# Patient Record
Sex: FEMALE | Race: OTHER | URBAN - METROPOLITAN AREA
[De-identification: names, ages, dates, MRNs, and addresses within clinical notes are randomized per-mention and may not be internally consistent; named-entity substitution may affect disease eponyms.]

---

## 2022-04-29 ENCOUNTER — TELEPHONE (OUTPATIENT)
Dept: OBGYN CLINIC | Facility: CLINIC | Age: 27
End: 2022-04-29

## 2022-04-29 NOTE — TELEPHONE ENCOUNTER
Pt confirms lmp 3/6/22 and cycles 28-32 days. Pt agrees to PN appt rotation with our 2 male and 4 female providers. OBN appt scheduled. Advised pt to start PNV with iron, folic acid and dha.

## 2022-05-10 ENCOUNTER — NURSE ONLY (OUTPATIENT)
Dept: OBGYN CLINIC | Facility: CLINIC | Age: 27
End: 2022-05-10
Payer: COMMERCIAL

## 2022-05-10 VITALS — BODY MASS INDEX: 24.83 KG/M2 | WEIGHT: 149 LBS | HEIGHT: 65 IN

## 2022-05-10 DIAGNOSIS — Z34.81 ENCOUNTER FOR SUPERVISION OF OTHER NORMAL PREGNANCY IN FIRST TRIMESTER: Primary | ICD-10-CM

## 2022-05-10 LAB
CONTROL LINE PRESENT WITH A CLEAR BACKGROUND (YES/NO): YES YES/NO
PREGNANCY TEST, URINE: POSITIVE

## 2022-05-10 PROCEDURE — 81025 URINE PREGNANCY TEST: CPT | Performed by: OBSTETRICS & GYNECOLOGY

## 2022-05-10 PROCEDURE — 3008F BODY MASS INDEX DOCD: CPT | Performed by: OBSTETRICS & GYNECOLOGY

## 2022-05-10 RX ORDER — CHOLECALCIFEROL (VITAMIN D3) 25 MCG
1 TABLET,CHEWABLE ORAL DAILY
COMMUNITY

## 2022-05-10 NOTE — PROGRESS NOTES
Pt seen for OBN appt today with no complaints. Normal PN labs ordered, plus hep C and varicella igg. Pt advised all labs must be completed and resulted prior to MD appt. Pt walked to  to schedule NPN appt with MD.    Landon Paez name is Brenden Cantu contact #[phone]; race:   Occupation:      MEDICAL HISTORY    Anemia Yes Hx of anemia, age 17   Anesthetic complications No    Anxiety/Depression  No    Autoimmune Disorder No    Asthma  No    Cancer No    Diabetes  No    Gyne/breast Surgery No    Heart Disease No    Hepatitis/Liver Disease  No    History of blood transfusion No    History of abnormal pap No    Hypertension  No    Infertility  No    Kidney Disease/Frequent UTIs  No    Medication Allergies No    Latex Allergies No    Food Allergies  No    Neurological Disorder/Epilepsy No    Operations/Hospitalizations No    TB exposure  No    Thyroid Dysfunction No    Trauma/Violence  No    Uterine Anomaly  No    Uterine Fibroids  No    Variocosities/DVTs No    Smoker No    Drug usage in prior year No    Alcohol No    Would you accept a blood transfusion? Yes                INFECTION HISTORY    Chlamydia No    Pt or partner have hx of Genital Herpes No    Gonorrhea No    Hepatitis B No    HIV No    HPV No    MRSA No    Syphilis No    Tattoos Yes 1, right arm   Live with someone or Exposed to TB No    Rash or viral illness since LMP  No    Varicella No Varicella ordered   Recent Travel (or planned travel) to [facility] for self and or partner Yes Trip to [facility] in July   Pets Yes 2 dogs       GENETICS SCREENING    Genetic Screening    Genetic Screening/Teratology Counseling- Includes patient, baby's father, or anyone in either family with:  Patient's age 28 years or older as of estimated date of delivery: No   Thalassemia ([place], [place], [address], or  background):  MCV less than 80: No   Neural tube defect (Meningomyelocele, Spina bifida, or Anencephaly): No   Congenital heart defect: No   Down syndrome: No   Aakash-Sachs (Ashkenazi [place], [place], [place]): No   Canavan disease (Ashkenazi Yaz[place]): No   Familial dysautonomia (Ashkenazi [place]az[place]): No   Sickle cell disease or trait (): No   Hemophilia or other blood disorders: No   Muscular dystrophy: No    Cystic fibrosis: No   Kurt's chorea: No   Intellectual disability and/or autism: No   Other inherited genetic or chromosomal disorder: No   Maternal metabolic disorder (eg. Type 1 diabetes, PKU): No   Patient or baby's father had child with birth defects not listed above: No   Recurrent pregnancy loss, or a stillbirth: No   Medications (including supplements, vitamins, herbs, or OTC drugs)/illicit/recreational drugs/alcohol since last menstrual period: No           MISC    Infant vaccinations  Yes       Pt. Has answered NO 5P questions and has NO  risk factors. Pt. Given What pregnant women need to know handout.

## 2022-05-16 ENCOUNTER — LAB ENCOUNTER (OUTPATIENT)
Dept: LAB | Facility: HOSPITAL | Age: 27
End: 2022-05-16
Attending: OBSTETRICS & GYNECOLOGY
Payer: COMMERCIAL

## 2022-05-16 DIAGNOSIS — Z34.81 ENCOUNTER FOR SUPERVISION OF OTHER NORMAL PREGNANCY IN FIRST TRIMESTER: ICD-10-CM

## 2022-05-16 LAB
ANTIBODY SCREEN: NEGATIVE
BASOPHILS # BLD AUTO: 0.01 X10(3) UL (ref 0–0.2)
BASOPHILS NFR BLD AUTO: 0.2 %
DEPRECATED RDW RBC AUTO: 40.6 FL (ref 35.1–46.3)
EOSINOPHIL # BLD AUTO: 0.02 X10(3) UL (ref 0–0.7)
EOSINOPHIL NFR BLD AUTO: 0.4 %
ERYTHROCYTE [DISTWIDTH] IN BLOOD BY AUTOMATED COUNT: 12.3 % (ref 11–15)
HBV SURFACE AG SER-ACNC: <0.1 [IU]/L
HBV SURFACE AG SERPL QL IA: NONREACTIVE
HCT VFR BLD AUTO: 38 %
HCV AB SERPL QL IA: NONREACTIVE
HGB BLD-MCNC: 12.5 G/DL
IMM GRANULOCYTES # BLD AUTO: 0.01 X10(3) UL (ref 0–1)
IMM GRANULOCYTES NFR BLD: 0.2 %
LYMPHOCYTES # BLD AUTO: 1.48 X10(3) UL (ref 1–4)
LYMPHOCYTES NFR BLD AUTO: 27 %
MCH RBC QN AUTO: 29.8 PG (ref 26–34)
MCHC RBC AUTO-ENTMCNC: 32.9 G/DL (ref 31–37)
MCV RBC AUTO: 90.7 FL
MONOCYTES # BLD AUTO: 0.25 X10(3) UL (ref 0.1–1)
MONOCYTES NFR BLD AUTO: 4.6 %
NEUTROPHILS # BLD AUTO: 3.71 X10 (3) UL (ref 1.5–7.7)
NEUTROPHILS # BLD AUTO: 3.71 X10(3) UL (ref 1.5–7.7)
NEUTROPHILS NFR BLD AUTO: 67.6 %
PLATELET # BLD AUTO: 211 10(3)UL (ref 150–450)
RBC # BLD AUTO: 4.19 X10(6)UL
RH BLOOD TYPE: POSITIVE
RUBV IGG SER QL: POSITIVE
RUBV IGG SER-ACNC: 111.2 IU/ML (ref 10–?)
WBC # BLD AUTO: 5.5 X10(3) UL (ref 4–11)

## 2022-05-16 PROCEDURE — 86850 RBC ANTIBODY SCREEN: CPT

## 2022-05-16 PROCEDURE — 36415 COLL VENOUS BLD VENIPUNCTURE: CPT

## 2022-05-16 PROCEDURE — 86900 BLOOD TYPING SEROLOGIC ABO: CPT

## 2022-05-16 PROCEDURE — 86780 TREPONEMA PALLIDUM: CPT

## 2022-05-16 PROCEDURE — 87086 URINE CULTURE/COLONY COUNT: CPT

## 2022-05-16 PROCEDURE — 86787 VARICELLA-ZOSTER ANTIBODY: CPT

## 2022-05-16 PROCEDURE — 85025 COMPLETE CBC W/AUTO DIFF WBC: CPT

## 2022-05-16 PROCEDURE — 86901 BLOOD TYPING SEROLOGIC RH(D): CPT

## 2022-05-16 PROCEDURE — 86762 RUBELLA ANTIBODY: CPT

## 2022-05-16 PROCEDURE — 87340 HEPATITIS B SURFACE AG IA: CPT

## 2022-05-16 PROCEDURE — 87389 HIV-1 AG W/HIV-1&-2 AB AG IA: CPT

## 2022-05-16 PROCEDURE — 86803 HEPATITIS C AB TEST: CPT

## 2022-05-18 LAB
T PALLIDUM AB SER QL: NEGATIVE
VZV IGG SER IA-ACNC: 1933 (ref 165–?)

## 2022-05-19 ENCOUNTER — INITIAL PRENATAL (OUTPATIENT)
Dept: OBGYN CLINIC | Facility: CLINIC | Age: 27
End: 2022-05-19
Payer: COMMERCIAL

## 2022-05-19 VITALS
SYSTOLIC BLOOD PRESSURE: 109 MMHG | WEIGHT: 147.38 LBS | DIASTOLIC BLOOD PRESSURE: 70 MMHG | HEART RATE: 90 BPM | BODY MASS INDEX: 25 KG/M2

## 2022-05-19 DIAGNOSIS — Z11.3 SCREENING FOR STDS (SEXUALLY TRANSMITTED DISEASES): ICD-10-CM

## 2022-05-19 DIAGNOSIS — Z34.81 ENCOUNTER FOR SUPERVISION OF OTHER NORMAL PREGNANCY IN FIRST TRIMESTER: Primary | ICD-10-CM

## 2022-05-19 DIAGNOSIS — Z12.4 SCREENING FOR MALIGNANT NEOPLASM OF CERVIX: ICD-10-CM

## 2022-05-19 LAB
APPEARANCE: CLEAR
BILIRUBIN: NEGATIVE
GLUCOSE (URINE DIPSTICK): NEGATIVE MG/DL
KETONES (URINE DIPSTICK): NEGATIVE MG/DL
LEUKOCYTES: NEGATIVE
MULTISTIX LOT#: NORMAL NUMERIC
NITRITE, URINE: NEGATIVE
OCCULT BLOOD: NEGATIVE
PH, URINE: 7 (ref 4.5–8)
PROTEIN (URINE DIPSTICK): NEGATIVE MG/DL
SPECIFIC GRAVITY: 1.02 (ref 1–1.03)
URINE-COLOR: YELLOW
UROBILINOGEN,SEMI-QN: 0.2 MG/DL (ref 0–1.9)

## 2022-05-19 PROCEDURE — 81002 URINALYSIS NONAUTO W/O SCOPE: CPT | Performed by: OBSTETRICS & GYNECOLOGY

## 2022-05-19 PROCEDURE — 3074F SYST BP LT 130 MM HG: CPT | Performed by: OBSTETRICS & GYNECOLOGY

## 2022-05-19 PROCEDURE — 3078F DIAST BP <80 MM HG: CPT | Performed by: OBSTETRICS & GYNECOLOGY

## 2022-05-20 PROBLEM — Z78.9 RECENT FOREIGN TRAVEL: Status: ACTIVE | Noted: 2022-05-20

## 2022-05-20 LAB
C TRACH DNA SPEC QL NAA+PROBE: NEGATIVE
N GONORRHOEA DNA SPEC QL NAA+PROBE: NEGATIVE
T VAGINALIS RRNA SPEC QL NAA+PROBE: NEGATIVE

## 2022-06-02 ENCOUNTER — TELEPHONE (OUTPATIENT)
Dept: OBGYN CLINIC | Facility: CLINIC | Age: 27
End: 2022-06-02

## 2022-06-02 NOTE — TELEPHONE ENCOUNTER
Form received for clearance for dental for the pt.   The dental letter sent for the pt to [address] [place] [address].

## 2022-06-20 ENCOUNTER — ROUTINE PRENATAL (OUTPATIENT)
Dept: OBGYN CLINIC | Facility: CLINIC | Age: 27
End: 2022-06-20
Payer: COMMERCIAL

## 2022-06-20 VITALS
DIASTOLIC BLOOD PRESSURE: 66 MMHG | WEIGHT: 148.19 LBS | BODY MASS INDEX: 25 KG/M2 | SYSTOLIC BLOOD PRESSURE: 105 MMHG | HEART RATE: 84 BPM

## 2022-06-20 DIAGNOSIS — Z3A.15 15 WEEKS GESTATION OF PREGNANCY: ICD-10-CM

## 2022-06-20 DIAGNOSIS — Z34.80 ENCOUNTER FOR SUPERVISION OF OTHER NORMAL PREGNANCY, UNSPECIFIED TRIMESTER: Primary | ICD-10-CM

## 2022-06-20 LAB
APPEARANCE: CLEAR
BILIRUBIN: NEGATIVE
GLUCOSE (URINE DIPSTICK): NEGATIVE MG/DL
KETONES (URINE DIPSTICK): NEGATIVE MG/DL
LEUKOCYTES: NEGATIVE
MULTISTIX EXPIRATION DATE: 7 9 22 DATE
MULTISTIX LOT#: NORMAL NUMERIC
NITRITE, URINE: NEGATIVE
OCCULT BLOOD: NEGATIVE
PH, URINE: 5 (ref 4.5–8)
PROTEIN (URINE DIPSTICK): NEGATIVE MG/DL
SPECIFIC GRAVITY: 1.03 (ref 1–1.03)
URINE-COLOR: YELLOW
UROBILINOGEN,SEMI-QN: 0.2 MG/DL (ref 0–1.9)

## 2022-06-20 PROCEDURE — 3074F SYST BP LT 130 MM HG: CPT | Performed by: NURSE PRACTITIONER

## 2022-06-20 PROCEDURE — 81002 URINALYSIS NONAUTO W/O SCOPE: CPT | Performed by: NURSE PRACTITIONER

## 2022-06-20 PROCEDURE — 3078F DIAST BP <80 MM HG: CPT | Performed by: NURSE PRACTITIONER

## 2022-06-20 PROCEDURE — 76805 OB US >/= 14 WKS SNGL FETUS: CPT | Performed by: NURSE PRACTITIONER

## 2022-06-20 NOTE — PROGRESS NOTES
Feeling well. No pelvic pain or bleeding  Declines genetic testing. Anatomy US ordered  +FHTs on bedside US, unable to hear on doppler.   rto 4 weeks    TREMAYNE Martínez

## 2022-07-05 ENCOUNTER — TELEPHONE (OUTPATIENT)
Dept: OBGYN CLINIC | Facility: CLINIC | Age: 27
End: 2022-07-05

## 2022-07-06 NOTE — TELEPHONE ENCOUNTER
Pt has sudden onset of back pain when she got up out of bed this am- we discussed that she may have thrown out her back or having a muscle spasm. She is only 17 weeks pregnant. rec tylenol and intermittent heating pads and stretches.   Call if sx not improved in the am.

## 2022-07-18 ENCOUNTER — ROUTINE PRENATAL (OUTPATIENT)
Dept: OBGYN CLINIC | Facility: CLINIC | Age: 27
End: 2022-07-18
Payer: COMMERCIAL

## 2022-07-18 VITALS
HEART RATE: 78 BPM | WEIGHT: 152.63 LBS | BODY MASS INDEX: 25 KG/M2 | SYSTOLIC BLOOD PRESSURE: 112 MMHG | DIASTOLIC BLOOD PRESSURE: 69 MMHG

## 2022-07-18 DIAGNOSIS — Z34.82 ENCOUNTER FOR SUPERVISION OF OTHER NORMAL PREGNANCY IN SECOND TRIMESTER: Primary | ICD-10-CM

## 2022-07-18 LAB
BILIRUBIN: NEGATIVE
GLUCOSE (URINE DIPSTICK): NEGATIVE MG/DL
KETONES (URINE DIPSTICK): NEGATIVE MG/DL
LEUKOCYTES: NEGATIVE
MULTISTIX EXPIRATION DATE: NORMAL DATE
MULTISTIX LOT#: 2030 NUMERIC
NITRITE, URINE: NEGATIVE
OCCULT BLOOD: NEGATIVE
PH, URINE: 7.5 (ref 4.5–8)
PROTEIN (URINE DIPSTICK): NEGATIVE MG/DL
SPECIFIC GRAVITY: 1.01 (ref 1–1.03)
UROBILINOGEN,SEMI-QN: 0.2 MG/DL (ref 0–1.9)

## 2022-07-18 PROCEDURE — 3078F DIAST BP <80 MM HG: CPT | Performed by: OBSTETRICS & GYNECOLOGY

## 2022-07-18 PROCEDURE — 3074F SYST BP LT 130 MM HG: CPT | Performed by: OBSTETRICS & GYNECOLOGY

## 2022-07-18 PROCEDURE — 81002 URINALYSIS NONAUTO W/O SCOPE: CPT | Performed by: OBSTETRICS & GYNECOLOGY

## 2022-08-12 ENCOUNTER — HOSPITAL ENCOUNTER (OUTPATIENT)
Dept: ULTRASOUND IMAGING | Facility: HOSPITAL | Age: 27
Discharge: HOME | End: 2022-08-12
Attending: NURSE PRACTITIONER
Payer: COMMERCIAL

## 2022-08-12 DIAGNOSIS — Z3A.15 15 WEEKS GESTATION OF PREGNANCY: ICD-10-CM

## 2022-08-12 DIAGNOSIS — Z34.80 ENCOUNTER FOR SUPERVISION OF OTHER NORMAL PREGNANCY, UNSPECIFIED TRIMESTER: ICD-10-CM

## 2022-08-12 PROCEDURE — 76805 OB US >/= 14 WKS SNGL FETUS: CPT | Performed by: NURSE PRACTITIONER

## 2022-08-15 NOTE — PROGRESS NOTES
Song Hinds,    Your ultrasound is reassuring and does not show any structural concerns with baby. Keep your appointment tomorrow.     Take care  TREMAYNE Zamora

## 2022-08-16 ENCOUNTER — ROUTINE PRENATAL (OUTPATIENT)
Dept: OBGYN CLINIC | Facility: CLINIC | Age: 27
End: 2022-08-16
Payer: COMMERCIAL

## 2022-08-16 VITALS
BODY MASS INDEX: 26 KG/M2 | SYSTOLIC BLOOD PRESSURE: 109 MMHG | DIASTOLIC BLOOD PRESSURE: 66 MMHG | WEIGHT: 155 LBS | HEART RATE: 77 BPM

## 2022-08-16 DIAGNOSIS — Z34.92 ENCOUNTER FOR SUPERVISION OF NORMAL PREGNANCY IN SECOND TRIMESTER, UNSPECIFIED GRAVIDITY: Primary | ICD-10-CM

## 2022-08-16 LAB
APPEARANCE: CLEAR
BILIRUBIN: NEGATIVE
GLUCOSE (URINE DIPSTICK): NEGATIVE MG/DL
KETONES (URINE DIPSTICK): NEGATIVE MG/DL
LEUKOCYTES: NEGATIVE
MULTISTIX LOT#: NORMAL NUMERIC
NITRITE, URINE: NEGATIVE
OCCULT BLOOD: NEGATIVE
PH, URINE: 7 (ref 4.5–8)
PROTEIN (URINE DIPSTICK): NEGATIVE MG/DL
SPECIFIC GRAVITY: 1.01 (ref 1–1.03)
URINE-COLOR: YELLOW
UROBILINOGEN,SEMI-QN: 0.2 MG/DL (ref 0–1.9)

## 2022-08-16 PROCEDURE — 3074F SYST BP LT 130 MM HG: CPT | Performed by: OBSTETRICS & GYNECOLOGY

## 2022-08-16 PROCEDURE — 81002 URINALYSIS NONAUTO W/O SCOPE: CPT | Performed by: OBSTETRICS & GYNECOLOGY

## 2022-08-16 PROCEDURE — 3078F DIAST BP <80 MM HG: CPT | Performed by: OBSTETRICS & GYNECOLOGY

## 2022-08-16 NOTE — PROGRESS NOTES
NO acute complaints. Some pelvic pressure. Encouraged pelvic support band. Normal level 1. Baby girl. Has 2T labs. RTC 4 wks.

## 2022-08-17 ENCOUNTER — PATIENT MESSAGE (OUTPATIENT)
Dept: OBGYN CLINIC | Facility: CLINIC | Age: 27
End: 2022-08-17

## 2022-08-17 NOTE — TELEPHONE ENCOUNTER
From: Cinthia Andrews  To: TREMAYNE Mcmahan  Sent: 8/17/2022 9:26 AM CDT  Subject: Hair services    Hello, sorry to bother about something foolish but I was wondering if it's alright to get a color service done on my hair? Just wondering if its alright or not for baby. Thanks!

## 2022-09-12 ENCOUNTER — LAB ENCOUNTER (OUTPATIENT)
Dept: LAB | Facility: HOSPITAL | Age: 27
End: 2022-09-12
Attending: OBSTETRICS & GYNECOLOGY
Payer: COMMERCIAL

## 2022-09-12 DIAGNOSIS — Z34.92 ENCOUNTER FOR SUPERVISION OF NORMAL PREGNANCY IN SECOND TRIMESTER, UNSPECIFIED GRAVIDITY: ICD-10-CM

## 2022-09-12 LAB
DEPRECATED RDW RBC AUTO: 42.8 FL (ref 35.1–46.3)
ERYTHROCYTE [DISTWIDTH] IN BLOOD BY AUTOMATED COUNT: 12.5 % (ref 11–15)
GLUCOSE 1H P GLC SERPL-MCNC: 118 MG/DL
HCT VFR BLD AUTO: 33.9 %
HGB BLD-MCNC: 11.1 G/DL
MCH RBC QN AUTO: 30.7 PG (ref 26–34)
MCHC RBC AUTO-ENTMCNC: 32.7 G/DL (ref 31–37)
MCV RBC AUTO: 93.9 FL
PLATELET # BLD AUTO: 212 10(3)UL (ref 150–450)
RBC # BLD AUTO: 3.61 X10(6)UL
WBC # BLD AUTO: 7.7 X10(3) UL (ref 4–11)

## 2022-09-12 PROCEDURE — 82950 GLUCOSE TEST: CPT

## 2022-09-12 PROCEDURE — 36415 COLL VENOUS BLD VENIPUNCTURE: CPT

## 2022-09-12 PROCEDURE — 85027 COMPLETE CBC AUTOMATED: CPT

## 2022-09-15 ENCOUNTER — ROUTINE PRENATAL (OUTPATIENT)
Dept: OBGYN CLINIC | Facility: CLINIC | Age: 27
End: 2022-09-15
Payer: COMMERCIAL

## 2022-09-15 VITALS
SYSTOLIC BLOOD PRESSURE: 100 MMHG | BODY MASS INDEX: 27 KG/M2 | DIASTOLIC BLOOD PRESSURE: 59 MMHG | WEIGHT: 160 LBS | HEART RATE: 96 BPM

## 2022-09-15 DIAGNOSIS — Z34.80 ENCOUNTER FOR SUPERVISION OF OTHER NORMAL PREGNANCY, UNSPECIFIED TRIMESTER: Primary | ICD-10-CM

## 2022-09-15 LAB
APPEARANCE: CLEAR
BILIRUBIN: NEGATIVE
GLUCOSE (URINE DIPSTICK): NEGATIVE MG/DL
LEUKOCYTES: NEGATIVE
MULTISTIX LOT#: NORMAL NUMERIC
NITRITE, URINE: NEGATIVE
OCCULT BLOOD: NEGATIVE
PH, URINE: 6.5 (ref 4.5–8)
PROTEIN (URINE DIPSTICK): NEGATIVE MG/DL
SPECIFIC GRAVITY: 1.02 (ref 1–1.03)
URINE-COLOR: YELLOW
UROBILINOGEN,SEMI-QN: 0.2 MG/DL (ref 0–1.9)

## 2022-09-15 PROCEDURE — 3074F SYST BP LT 130 MM HG: CPT | Performed by: OBSTETRICS & GYNECOLOGY

## 2022-09-15 PROCEDURE — 90715 TDAP VACCINE 7 YRS/> IM: CPT | Performed by: OBSTETRICS & GYNECOLOGY

## 2022-09-15 PROCEDURE — 90471 IMMUNIZATION ADMIN: CPT | Performed by: OBSTETRICS & GYNECOLOGY

## 2022-09-15 PROCEDURE — 81002 URINALYSIS NONAUTO W/O SCOPE: CPT | Performed by: OBSTETRICS & GYNECOLOGY

## 2022-09-15 PROCEDURE — 3078F DIAST BP <80 MM HG: CPT | Performed by: OBSTETRICS & GYNECOLOGY

## 2022-09-26 ENCOUNTER — ROUTINE PRENATAL (OUTPATIENT)
Dept: OBGYN CLINIC | Facility: CLINIC | Age: 27
End: 2022-09-26

## 2022-09-26 VITALS
BODY MASS INDEX: 27 KG/M2 | DIASTOLIC BLOOD PRESSURE: 61 MMHG | HEART RATE: 81 BPM | SYSTOLIC BLOOD PRESSURE: 100 MMHG | WEIGHT: 160 LBS

## 2022-09-26 DIAGNOSIS — Z34.91 ENCOUNTER FOR SUPERVISION OF NORMAL PREGNANCY IN FIRST TRIMESTER, UNSPECIFIED GRAVIDITY: Primary | ICD-10-CM

## 2022-09-26 PROCEDURE — 3078F DIAST BP <80 MM HG: CPT | Performed by: OBSTETRICS & GYNECOLOGY

## 2022-09-26 PROCEDURE — 3074F SYST BP LT 130 MM HG: CPT | Performed by: OBSTETRICS & GYNECOLOGY

## 2022-09-26 PROCEDURE — 81002 URINALYSIS NONAUTO W/O SCOPE: CPT | Performed by: OBSTETRICS & GYNECOLOGY

## 2022-10-17 ENCOUNTER — ROUTINE PRENATAL (OUTPATIENT)
Dept: OBGYN CLINIC | Facility: CLINIC | Age: 27
End: 2022-10-17
Payer: COMMERCIAL

## 2022-10-17 VITALS
BODY MASS INDEX: 27 KG/M2 | SYSTOLIC BLOOD PRESSURE: 106 MMHG | WEIGHT: 163 LBS | DIASTOLIC BLOOD PRESSURE: 65 MMHG | HEART RATE: 80 BPM

## 2022-10-17 DIAGNOSIS — Z3A.32 32 WEEKS GESTATION OF PREGNANCY: ICD-10-CM

## 2022-10-17 DIAGNOSIS — Z34.93 ENCOUNTER FOR SUPERVISION OF NORMAL PREGNANCY IN THIRD TRIMESTER, UNSPECIFIED GRAVIDITY: Primary | ICD-10-CM

## 2022-10-17 LAB
APPEARANCE: CLEAR
BILIRUBIN: NEGATIVE
GLUCOSE (URINE DIPSTICK): NEGATIVE MG/DL
KETONES (URINE DIPSTICK): NEGATIVE MG/DL
LEUKOCYTES: NEGATIVE
MULTISTIX LOT#: NORMAL NUMERIC
NITRITE, URINE: NEGATIVE
OCCULT BLOOD: NEGATIVE
PH, URINE: 6.5 (ref 4.5–8)
SPECIFIC GRAVITY: 1.01 (ref 1–1.03)
URINE-COLOR: YELLOW
UROBILINOGEN,SEMI-QN: 0.2 MG/DL (ref 0–1.9)

## 2022-10-17 PROCEDURE — 81002 URINALYSIS NONAUTO W/O SCOPE: CPT | Performed by: NURSE PRACTITIONER

## 2022-10-17 PROCEDURE — 3074F SYST BP LT 130 MM HG: CPT | Performed by: NURSE PRACTITIONER

## 2022-10-17 PROCEDURE — 3078F DIAST BP <80 MM HG: CPT | Performed by: NURSE PRACTITIONER

## 2022-10-17 NOTE — PROGRESS NOTES
Feeling good, +FM, denies lof or bleeding, no contractions. Declines flu shot. Discussed covid booster. Planning to breast feed. Considering nuvaring postpartum. rev'd kick counts, sx of PTL, rev'd classes and tour.     rto 2 weeks  TREMAYNE Carpio

## 2022-11-02 ENCOUNTER — LAB ENCOUNTER (OUTPATIENT)
Dept: LAB | Facility: HOSPITAL | Age: 27
End: 2022-11-02
Attending: NURSE PRACTITIONER
Payer: COMMERCIAL

## 2022-11-02 DIAGNOSIS — Z34.93 ENCOUNTER FOR SUPERVISION OF NORMAL PREGNANCY IN THIRD TRIMESTER, UNSPECIFIED GRAVIDITY: ICD-10-CM

## 2022-11-02 LAB
DEPRECATED RDW RBC AUTO: 41.1 FL (ref 35.1–46.3)
ERYTHROCYTE [DISTWIDTH] IN BLOOD BY AUTOMATED COUNT: 12.5 % (ref 11–15)
HCT VFR BLD AUTO: 35.4 %
HGB BLD-MCNC: 11.7 G/DL
MCH RBC QN AUTO: 30.3 PG (ref 26–34)
MCHC RBC AUTO-ENTMCNC: 33.1 G/DL (ref 31–37)
MCV RBC AUTO: 91.7 FL
PLATELET # BLD AUTO: 205 10(3)UL (ref 150–450)
RBC # BLD AUTO: 3.86 X10(6)UL
WBC # BLD AUTO: 7.8 X10(3) UL (ref 4–11)

## 2022-11-02 PROCEDURE — 85027 COMPLETE CBC AUTOMATED: CPT

## 2022-11-02 PROCEDURE — 36415 COLL VENOUS BLD VENIPUNCTURE: CPT

## 2022-11-02 PROCEDURE — 87389 HIV-1 AG W/HIV-1&-2 AB AG IA: CPT

## 2022-11-02 PROCEDURE — 86780 TREPONEMA PALLIDUM: CPT

## 2022-11-04 ENCOUNTER — TELEPHONE (OUTPATIENT)
Dept: OBGYN CLINIC | Facility: CLINIC | Age: 27
End: 2022-11-04

## 2022-11-04 ENCOUNTER — ROUTINE PRENATAL (OUTPATIENT)
Dept: OBGYN CLINIC | Facility: CLINIC | Age: 27
End: 2022-11-04
Payer: COMMERCIAL

## 2022-11-04 VITALS
BODY MASS INDEX: 27 KG/M2 | WEIGHT: 164.19 LBS | DIASTOLIC BLOOD PRESSURE: 56 MMHG | SYSTOLIC BLOOD PRESSURE: 111 MMHG | HEART RATE: 86 BPM

## 2022-11-04 DIAGNOSIS — Z34.93 ENCOUNTER FOR SUPERVISION OF NORMAL PREGNANCY IN THIRD TRIMESTER, UNSPECIFIED GRAVIDITY: Primary | ICD-10-CM

## 2022-11-04 LAB
APPEARANCE: CLEAR
BILIRUBIN: NEGATIVE
GLUCOSE (URINE DIPSTICK): NEGATIVE MG/DL
KETONES (URINE DIPSTICK): NEGATIVE MG/DL
LEUKOCYTES: NEGATIVE
MULTISTIX LOT#: NORMAL NUMERIC
NITRITE, URINE: NEGATIVE
OCCULT BLOOD: NEGATIVE
PH, URINE: 7 (ref 4.5–8)
PROTEIN (URINE DIPSTICK): NEGATIVE MG/DL
SPECIFIC GRAVITY: 1.01 (ref 1–1.03)
T PALLIDUM AB SER QL: NEGATIVE
URINE-COLOR: YELLOW
UROBILINOGEN,SEMI-QN: 0.2 MG/DL (ref 0–1.9)

## 2022-11-04 PROCEDURE — 3078F DIAST BP <80 MM HG: CPT | Performed by: OBSTETRICS & GYNECOLOGY

## 2022-11-04 PROCEDURE — 3074F SYST BP LT 130 MM HG: CPT | Performed by: OBSTETRICS & GYNECOLOGY

## 2022-11-04 PROCEDURE — 81002 URINALYSIS NONAUTO W/O SCOPE: CPT | Performed by: OBSTETRICS & GYNECOLOGY

## 2022-11-04 NOTE — TELEPHONE ENCOUNTER
Pt needs to be seen by Dr Cinthia Webb in 2 week and there is no availibility.  Please call pt to fit into Dr El joseph

## 2022-11-04 NOTE — TELEPHONE ENCOUNTER
34w5d. Pt called and offered appt with CAP on the 17th, pt accepts appt. Pt's appt on 11/16 with GUYN canceled, pt has seen JMN before. Has not seen CAP yet.

## 2022-11-17 ENCOUNTER — ROUTINE PRENATAL (OUTPATIENT)
Dept: OBGYN CLINIC | Facility: CLINIC | Age: 27
End: 2022-11-17
Payer: COMMERCIAL

## 2022-11-17 ENCOUNTER — TELEPHONE (OUTPATIENT)
Dept: OBGYN CLINIC | Facility: CLINIC | Age: 27
End: 2022-11-17

## 2022-11-17 VITALS
WEIGHT: 166 LBS | BODY MASS INDEX: 28 KG/M2 | DIASTOLIC BLOOD PRESSURE: 67 MMHG | SYSTOLIC BLOOD PRESSURE: 105 MMHG | HEART RATE: 80 BPM

## 2022-11-17 DIAGNOSIS — Z34.93 ENCOUNTER FOR SUPERVISION OF NORMAL PREGNANCY IN THIRD TRIMESTER, UNSPECIFIED GRAVIDITY: Primary | ICD-10-CM

## 2022-11-17 PROCEDURE — 3074F SYST BP LT 130 MM HG: CPT | Performed by: OBSTETRICS & GYNECOLOGY

## 2022-11-17 PROCEDURE — 3078F DIAST BP <80 MM HG: CPT | Performed by: OBSTETRICS & GYNECOLOGY

## 2022-11-17 PROCEDURE — 81002 URINALYSIS NONAUTO W/O SCOPE: CPT | Performed by: OBSTETRICS & GYNECOLOGY

## 2022-11-17 NOTE — TELEPHONE ENCOUNTER
PATIENT NEEDS APPOINTMENT FOR NEXT WEEK BUT THERE IS NOTHING AVAILABLE BESIDES Monday AT 1:50 BUT IS TOO SOON. PLEASE CALL PATIENT AND ADVISE.

## 2022-11-18 NOTE — TELEPHONE ENCOUNTER
Assisted pt with scheduling PN appt with GILBERT on 11/23/22 at 110pm at [facility]. Pt verbalized understanding.

## 2022-11-19 LAB — GROUP B STREP BY PCR FOR PCR OVT: NEGATIVE

## 2022-11-23 ENCOUNTER — ROUTINE PRENATAL (OUTPATIENT)
Dept: OBGYN CLINIC | Facility: CLINIC | Age: 27
End: 2022-11-23
Payer: COMMERCIAL

## 2022-11-23 VITALS
WEIGHT: 167.81 LBS | DIASTOLIC BLOOD PRESSURE: 68 MMHG | SYSTOLIC BLOOD PRESSURE: 111 MMHG | BODY MASS INDEX: 28 KG/M2 | HEART RATE: 78 BPM

## 2022-11-23 DIAGNOSIS — Z34.93 ENCOUNTER FOR SUPERVISION OF NORMAL PREGNANCY IN THIRD TRIMESTER, UNSPECIFIED GRAVIDITY: Primary | ICD-10-CM

## 2022-11-23 LAB
BILIRUBIN: NEGATIVE
GLUCOSE (URINE DIPSTICK): NEGATIVE MG/DL
KETONES (URINE DIPSTICK): NEGATIVE MG/DL
LEUKOCYTES: NEGATIVE
MULTISTIX EXPIRATION DATE: NORMAL DATE
MULTISTIX LOT#: 4023 NUMERIC
NITRITE, URINE: NEGATIVE
OCCULT BLOOD: NEGATIVE
PH, URINE: 6.5 (ref 4.5–8)
PROTEIN (URINE DIPSTICK): NEGATIVE MG/DL
SPECIFIC GRAVITY: 1.01 (ref 1–1.03)
UROBILINOGEN,SEMI-QN: 0.2 MG/DL (ref 0–1.9)

## 2022-11-23 PROCEDURE — 3078F DIAST BP <80 MM HG: CPT | Performed by: OBSTETRICS & GYNECOLOGY

## 2022-11-23 PROCEDURE — 3074F SYST BP LT 130 MM HG: CPT | Performed by: OBSTETRICS & GYNECOLOGY

## 2022-11-23 PROCEDURE — 81002 URINALYSIS NONAUTO W/O SCOPE: CPT | Performed by: OBSTETRICS & GYNECOLOGY

## 2022-12-01 ENCOUNTER — ROUTINE PRENATAL (OUTPATIENT)
Dept: OBGYN CLINIC | Facility: CLINIC | Age: 27
End: 2022-12-01
Payer: COMMERCIAL

## 2022-12-01 VITALS
SYSTOLIC BLOOD PRESSURE: 112 MMHG | DIASTOLIC BLOOD PRESSURE: 66 MMHG | WEIGHT: 168.81 LBS | HEART RATE: 84 BPM | BODY MASS INDEX: 28 KG/M2

## 2022-12-01 DIAGNOSIS — Z34.93 ENCOUNTER FOR SUPERVISION OF NORMAL PREGNANCY IN THIRD TRIMESTER, UNSPECIFIED GRAVIDITY: Primary | ICD-10-CM

## 2022-12-01 PROCEDURE — 3078F DIAST BP <80 MM HG: CPT | Performed by: OBSTETRICS & GYNECOLOGY

## 2022-12-01 PROCEDURE — 81002 URINALYSIS NONAUTO W/O SCOPE: CPT | Performed by: OBSTETRICS & GYNECOLOGY

## 2022-12-01 PROCEDURE — 3074F SYST BP LT 130 MM HG: CPT | Performed by: OBSTETRICS & GYNECOLOGY

## 2022-12-01 NOTE — PROGRESS NOTES
Complaining of leaking starting earlier in the week; speculum exam negative for ROM. Labor instructions discussed.  RTC 1 wk

## 2022-12-08 ENCOUNTER — ROUTINE PRENATAL (OUTPATIENT)
Dept: OBGYN CLINIC | Facility: CLINIC | Age: 27
End: 2022-12-08
Payer: COMMERCIAL

## 2022-12-08 VITALS
WEIGHT: 171.19 LBS | HEART RATE: 91 BPM | BODY MASS INDEX: 28 KG/M2 | DIASTOLIC BLOOD PRESSURE: 69 MMHG | SYSTOLIC BLOOD PRESSURE: 109 MMHG

## 2022-12-08 DIAGNOSIS — Z34.91 ENCOUNTER FOR SUPERVISION OF NORMAL PREGNANCY IN FIRST TRIMESTER, UNSPECIFIED GRAVIDITY: Primary | ICD-10-CM

## 2022-12-08 PROCEDURE — 3074F SYST BP LT 130 MM HG: CPT | Performed by: OBSTETRICS & GYNECOLOGY

## 2022-12-08 PROCEDURE — 3078F DIAST BP <80 MM HG: CPT | Performed by: OBSTETRICS & GYNECOLOGY

## 2022-12-08 PROCEDURE — 81002 URINALYSIS NONAUTO W/O SCOPE: CPT | Performed by: OBSTETRICS & GYNECOLOGY

## 2022-12-13 ENCOUNTER — ROUTINE PRENATAL (OUTPATIENT)
Dept: OBGYN CLINIC | Facility: CLINIC | Age: 27
End: 2022-12-13
Payer: COMMERCIAL

## 2022-12-13 ENCOUNTER — TELEPHONE (OUTPATIENT)
Dept: OBGYN CLINIC | Facility: CLINIC | Age: 27
End: 2022-12-13

## 2022-12-13 VITALS
DIASTOLIC BLOOD PRESSURE: 68 MMHG | WEIGHT: 168.81 LBS | BODY MASS INDEX: 28 KG/M2 | SYSTOLIC BLOOD PRESSURE: 110 MMHG | HEART RATE: 79 BPM

## 2022-12-13 DIAGNOSIS — Z34.91 ENCOUNTER FOR SUPERVISION OF NORMAL PREGNANCY IN FIRST TRIMESTER, UNSPECIFIED GRAVIDITY: Primary | ICD-10-CM

## 2022-12-13 LAB
BILIRUBIN: NEGATIVE
GLUCOSE (URINE DIPSTICK): NEGATIVE MG/DL
KETONES (URINE DIPSTICK): NEGATIVE MG/DL
LEUKOCYTES: NEGATIVE
MULTISTIX LOT#: NORMAL NUMERIC
NITRITE, URINE: NEGATIVE
OCCULT BLOOD: NEGATIVE
PH, URINE: 6 (ref 4.5–8)
PROTEIN (URINE DIPSTICK): NEGATIVE MG/DL
SPECIFIC GRAVITY: 1.01 (ref 1–1.03)
UROBILINOGEN,SEMI-QN: 0.2 MG/DL (ref 0–1.9)

## 2022-12-13 PROCEDURE — 81002 URINALYSIS NONAUTO W/O SCOPE: CPT | Performed by: OBSTETRICS & GYNECOLOGY

## 2022-12-13 PROCEDURE — 3074F SYST BP LT 130 MM HG: CPT | Performed by: OBSTETRICS & GYNECOLOGY

## 2022-12-13 PROCEDURE — 3078F DIAST BP <80 MM HG: CPT | Performed by: OBSTETRICS & GYNECOLOGY

## 2022-12-13 NOTE — TELEPHONE ENCOUNTER
Per doctor; needs to come back this Saturday before giving birth passed due date so needs to get in one more time.  Please call to squeeze in any time

## 2022-12-13 NOTE — TELEPHONE ENCOUNTER
Pt is 40w2d. Per [facility] pt needs to be seen this Saturday 12/17 or monday 12/19. There are no openings with any provider on either day. Message to all MDs to see where pt can be added .

## 2022-12-14 NOTE — TELEPHONE ENCOUNTER
MD Kena Sawyer, RN 44 minutes ago (5:55 PM)     Pt can be added 12/17 at 11:30 at the end of my schedule      Pt advised of CAP's msg above and accepted appt.

## 2022-12-17 ENCOUNTER — ORDERS FOR ADMISSION (OUTPATIENT)
Dept: OBGYN CLINIC | Facility: CLINIC | Age: 27
End: 2022-12-17

## 2022-12-17 ENCOUNTER — HOSPITAL ENCOUNTER (OUTPATIENT)
Facility: HOSPITAL | Age: 27
Discharge: HOME | End: 2022-12-17
Attending: OBSTETRICS & GYNECOLOGY | Admitting: OBSTETRICS & GYNECOLOGY
Payer: COMMERCIAL

## 2022-12-17 ENCOUNTER — ROUTINE PRENATAL (OUTPATIENT)
Dept: OBGYN CLINIC | Facility: CLINIC | Age: 27
End: 2022-12-17
Payer: COMMERCIAL

## 2022-12-17 VITALS
BODY MASS INDEX: 28 KG/M2 | WEIGHT: 168 LBS | HEART RATE: 69 BPM | SYSTOLIC BLOOD PRESSURE: 109 MMHG | DIASTOLIC BLOOD PRESSURE: 71 MMHG

## 2022-12-17 VITALS — HEART RATE: 67 BPM | SYSTOLIC BLOOD PRESSURE: 118 MMHG | DIASTOLIC BLOOD PRESSURE: 68 MMHG

## 2022-12-17 DIAGNOSIS — Z34.83 ENCOUNTER FOR SUPERVISION OF OTHER NORMAL PREGNANCY IN THIRD TRIMESTER: Primary | ICD-10-CM

## 2022-12-17 DIAGNOSIS — O48.0 POST-DATES PREGNANCY: ICD-10-CM

## 2022-12-17 PROCEDURE — 59025 FETAL NON-STRESS TEST: CPT

## 2022-12-17 NOTE — PROGRESS NOTES
Pt is a 24year old female admitted to [facility]. Patient presents with:  Non-stress Test: NST for Post Dates     Pt is  40w6d intra-uterine pregnancy. History obtained, consents signed. Oriented to room, staff, and plan of care.

## 2022-12-18 PROBLEM — O48.0 POST-DATES PREGNANCY (HCC): Status: ACTIVE | Noted: 2022-12-18

## 2022-12-18 PROBLEM — O48.0 POST-DATES PREGNANCY: Status: ACTIVE | Noted: 2022-12-18

## 2022-12-18 RX ORDER — TRISODIUM CITRATE DIHYDRATE AND CITRIC ACID MONOHYDRATE 500; 334 MG/5ML; MG/5ML
30 SOLUTION ORAL AS NEEDED
Status: SHIPPED | OUTPATIENT
Start: 2022-12-18

## 2022-12-18 RX ORDER — ONDANSETRON 2 MG/ML
4 INJECTION INTRAMUSCULAR; INTRAVENOUS EVERY 6 HOURS PRN
Status: SHIPPED | OUTPATIENT
Start: 2022-12-18

## 2022-12-18 RX ORDER — TERBUTALINE SULFATE 1 MG/ML
0.25 INJECTION, SOLUTION SUBCUTANEOUS AS NEEDED
Status: SHIPPED | OUTPATIENT
Start: 2022-12-18

## 2022-12-18 RX ORDER — AMMONIA INHALANTS 0.04 G/.3ML
0.3 INHALANT RESPIRATORY (INHALATION) AS NEEDED
Status: SHIPPED | OUTPATIENT
Start: 2022-12-18

## 2022-12-18 RX ORDER — IBUPROFEN 600 MG/1
600 TABLET ORAL EVERY 6 HOURS PRN
Status: SHIPPED | OUTPATIENT
Start: 2022-12-18 | End: 2022-12-20

## 2022-12-18 RX ORDER — MISOPROSTOL 100 UG/1
25 TABLET ORAL EVERY 4 HOURS
Status: SHIPPED | OUTPATIENT
Start: 2022-12-18 | End: 2022-12-20

## 2022-12-18 RX ORDER — ACETAMINOPHEN 500 MG
500 TABLET ORAL EVERY 6 HOURS PRN
Status: SHIPPED | OUTPATIENT
Start: 2022-12-18

## 2022-12-18 RX ORDER — LIDOCAINE HYDROCHLORIDE 10 MG/ML
30 INJECTION, SOLUTION EPIDURAL; INFILTRATION; INTRACAUDAL; PERINEURAL ONCE
Status: SHIPPED | OUTPATIENT
Start: 2022-12-18

## 2022-12-20 ENCOUNTER — ANESTHESIA EVENT (OUTPATIENT)
Dept: OBGYN UNIT | Facility: HOSPITAL | Age: 27
End: 2022-12-20
Payer: COMMERCIAL

## 2022-12-20 ENCOUNTER — TELEPHONE (OUTPATIENT)
Dept: OBGYN CLINIC | Facility: CLINIC | Age: 27
End: 2022-12-20

## 2022-12-20 ENCOUNTER — HOSPITAL ENCOUNTER (INPATIENT)
Facility: HOSPITAL | Age: 27
LOS: 2 days | Discharge: HOME | End: 2022-12-22
Attending: OBSTETRICS & GYNECOLOGY | Admitting: OBSTETRICS & GYNECOLOGY
Payer: COMMERCIAL

## 2022-12-20 ENCOUNTER — ANESTHESIA (OUTPATIENT)
Dept: OBGYN UNIT | Facility: HOSPITAL | Age: 27
End: 2022-12-20
Payer: COMMERCIAL

## 2022-12-20 PROBLEM — Z37.9 NORMAL LABOR: Status: ACTIVE | Noted: 2022-12-20

## 2022-12-20 PROBLEM — Z37.9 NORMAL LABOR (HCC): Status: ACTIVE | Noted: 2022-12-20

## 2022-12-20 LAB
ANTIBODY SCREEN: NEGATIVE
BASOPHILS # BLD AUTO: 0.01 X10(3) UL (ref 0–0.2)
BASOPHILS NFR BLD AUTO: 0.1 %
DEPRECATED RDW RBC AUTO: 40.9 FL (ref 35.1–46.3)
EOSINOPHIL # BLD AUTO: 0.01 X10(3) UL (ref 0–0.7)
EOSINOPHIL NFR BLD AUTO: 0.1 %
ERYTHROCYTE [DISTWIDTH] IN BLOOD BY AUTOMATED COUNT: 12.9 % (ref 11–15)
HCT VFR BLD AUTO: 36 %
HGB BLD-MCNC: 12.4 G/DL
IMM GRANULOCYTES # BLD AUTO: 0.05 X10(3) UL (ref 0–1)
IMM GRANULOCYTES NFR BLD: 0.6 %
LYMPHOCYTES # BLD AUTO: 1.16 X10(3) UL (ref 1–4)
LYMPHOCYTES NFR BLD AUTO: 12.8 %
MCH RBC QN AUTO: 30 PG (ref 26–34)
MCHC RBC AUTO-ENTMCNC: 34.4 G/DL (ref 31–37)
MCV RBC AUTO: 87 FL
MONOCYTES # BLD AUTO: 0.24 X10(3) UL (ref 0.1–1)
MONOCYTES NFR BLD AUTO: 2.6 %
NEUTROPHILS # BLD AUTO: 7.61 X10 (3) UL (ref 1.5–7.7)
NEUTROPHILS # BLD AUTO: 7.61 X10(3) UL (ref 1.5–7.7)
NEUTROPHILS NFR BLD AUTO: 83.8 %
PLATELET # BLD AUTO: 182 10(3)UL (ref 150–450)
RBC # BLD AUTO: 4.14 X10(6)UL
RH BLOOD TYPE: POSITIVE
SARS-COV-2 RNA RESP QL NAA+PROBE: NOT DETECTED
WBC # BLD AUTO: 9.1 X10(3) UL (ref 4–11)

## 2022-12-20 PROCEDURE — 59400 OBSTETRICAL CARE: CPT | Performed by: OBSTETRICS & GYNECOLOGY

## 2022-12-20 PROCEDURE — 0HQ9XZZ REPAIR PERINEUM SKIN, EXTERNAL APPROACH: ICD-10-PCS | Performed by: OBSTETRICS & GYNECOLOGY

## 2022-12-20 RX ORDER — TERBUTALINE SULFATE 1 MG/ML
0.25 INJECTION, SOLUTION SUBCUTANEOUS AS NEEDED
Status: DISCONTINUED | OUTPATIENT
Start: 2022-12-20 | End: 2022-12-20 | Stop reason: HOSPADM

## 2022-12-20 RX ORDER — BUPIVACAINE HCL/0.9 % NACL/PF 0.25 %
5 PLASTIC BAG, INJECTION (ML) EPIDURAL AS NEEDED
Status: DISCONTINUED | OUTPATIENT
Start: 2022-12-20 | End: 2022-12-22

## 2022-12-20 RX ORDER — AMMONIA INHALANTS 0.04 G/.3ML
0.3 INHALANT RESPIRATORY (INHALATION) AS NEEDED
Status: DISCONTINUED | OUTPATIENT
Start: 2022-12-20 | End: 2022-12-20 | Stop reason: HOSPADM

## 2022-12-20 RX ORDER — LIDOCAINE HYDROCHLORIDE AND EPINEPHRINE 15; 5 MG/ML; UG/ML
INJECTION, SOLUTION EPIDURAL AS NEEDED
Status: DISCONTINUED | OUTPATIENT
Start: 2022-12-20 | End: 2022-12-20 | Stop reason: SURG

## 2022-12-20 RX ORDER — DEXTROSE, SODIUM CHLORIDE, SODIUM LACTATE, POTASSIUM CHLORIDE, AND CALCIUM CHLORIDE 5; .6; .31; .03; .02 G/100ML; G/100ML; G/100ML; G/100ML; G/100ML
INJECTION, SOLUTION INTRAVENOUS CONTINUOUS
Status: DISCONTINUED | OUTPATIENT
Start: 2022-12-20 | End: 2022-12-20 | Stop reason: HOSPADM

## 2022-12-20 RX ORDER — TRISODIUM CITRATE DIHYDRATE AND CITRIC ACID MONOHYDRATE 500; 334 MG/5ML; MG/5ML
30 SOLUTION ORAL AS NEEDED
Status: DISCONTINUED | OUTPATIENT
Start: 2022-12-20 | End: 2022-12-20 | Stop reason: HOSPADM

## 2022-12-20 RX ORDER — METHYLERGONOVINE MALEATE 0.2 MG/ML
0.2 INJECTION INTRAVENOUS ONCE AS NEEDED
Status: ACTIVE | OUTPATIENT
Start: 2022-12-20 | End: 2022-12-20

## 2022-12-20 RX ORDER — ACETAMINOPHEN 500 MG
500 TABLET ORAL EVERY 6 HOURS PRN
Status: DISCONTINUED | OUTPATIENT
Start: 2022-12-20 | End: 2022-12-20 | Stop reason: HOSPADM

## 2022-12-20 RX ORDER — SODIUM CHLORIDE, SODIUM LACTATE, POTASSIUM CHLORIDE, CALCIUM CHLORIDE 600; 310; 30; 20 MG/100ML; MG/100ML; MG/100ML; MG/100ML
INJECTION, SOLUTION INTRAVENOUS CONTINUOUS
Status: DISCONTINUED | OUTPATIENT
Start: 2022-12-20 | End: 2022-12-20 | Stop reason: HOSPADM

## 2022-12-20 RX ORDER — ONDANSETRON 2 MG/ML
4 INJECTION INTRAMUSCULAR; INTRAVENOUS EVERY 6 HOURS PRN
Status: DISCONTINUED | OUTPATIENT
Start: 2022-12-20 | End: 2022-12-22

## 2022-12-20 RX ORDER — ACETAMINOPHEN 500 MG
1000 TABLET ORAL EVERY 6 HOURS PRN
Status: DISCONTINUED | OUTPATIENT
Start: 2022-12-20 | End: 2022-12-22

## 2022-12-20 RX ORDER — IBUPROFEN 600 MG/1
600 TABLET ORAL EVERY 6 HOURS PRN
Status: DISCONTINUED | OUTPATIENT
Start: 2022-12-20 | End: 2022-12-20 | Stop reason: HOSPADM

## 2022-12-20 RX ORDER — BUPIVACAINE HCL/0.9 % NACL/PF 0.25 %
PLASTIC BAG, INJECTION (ML) EPIDURAL
Status: DISPENSED
Start: 2022-12-20 | End: 2022-12-21

## 2022-12-20 RX ORDER — ACETAMINOPHEN 500 MG
500 TABLET ORAL EVERY 6 HOURS PRN
Status: DISCONTINUED | OUTPATIENT
Start: 2022-12-20 | End: 2022-12-22

## 2022-12-20 RX ORDER — LIDOCAINE HYDROCHLORIDE 10 MG/ML
INJECTION, SOLUTION EPIDURAL; INFILTRATION; INTRACAUDAL; PERINEURAL AS NEEDED
Status: DISCONTINUED | OUTPATIENT
Start: 2022-12-20 | End: 2022-12-20 | Stop reason: SURG

## 2022-12-20 RX ORDER — AMMONIA INHALANTS 0.04 G/.3ML
0.3 INHALANT RESPIRATORY (INHALATION) AS NEEDED
Status: DISCONTINUED | OUTPATIENT
Start: 2022-12-20 | End: 2022-12-22

## 2022-12-20 RX ORDER — BUPIVACAINE HYDROCHLORIDE 2.5 MG/ML
INJECTION, SOLUTION EPIDURAL; INFILTRATION; INTRACAUDAL
Status: DISCONTINUED
Start: 2022-12-20 | End: 2022-12-20 | Stop reason: WASHOUT

## 2022-12-20 RX ORDER — LIDOCAINE HYDROCHLORIDE 10 MG/ML
30 INJECTION, SOLUTION EPIDURAL; INFILTRATION; INTRACAUDAL; PERINEURAL ONCE
Status: DISCONTINUED | OUTPATIENT
Start: 2022-12-20 | End: 2022-12-20 | Stop reason: HOSPADM

## 2022-12-20 RX ORDER — DOCUSATE SODIUM 100 MG/1
100 CAPSULE, LIQUID FILLED ORAL
Status: DISCONTINUED | OUTPATIENT
Start: 2022-12-20 | End: 2022-12-22

## 2022-12-20 RX ORDER — IBUPROFEN 600 MG/1
600 TABLET ORAL EVERY 6 HOURS PRN
Status: DISCONTINUED | OUTPATIENT
Start: 2022-12-20 | End: 2022-12-22

## 2022-12-20 RX ORDER — SIMETHICONE 80 MG
80 TABLET,CHEWABLE ORAL 3 TIMES DAILY PRN
Status: DISCONTINUED | OUTPATIENT
Start: 2022-12-20 | End: 2022-12-22

## 2022-12-20 RX ORDER — DIAPER,BRIEF,INFANT-TODD,DISP
1 EACH MISCELLANEOUS EVERY 6 HOURS PRN
Status: DISCONTINUED | OUTPATIENT
Start: 2022-12-20 | End: 2022-12-22

## 2022-12-20 RX ORDER — ONDANSETRON 2 MG/ML
4 INJECTION INTRAMUSCULAR; INTRAVENOUS EVERY 6 HOURS PRN
Status: DISCONTINUED | OUTPATIENT
Start: 2022-12-20 | End: 2022-12-20 | Stop reason: HOSPADM

## 2022-12-20 RX ORDER — NALBUPHINE HCL 10 MG/ML
2.5 AMPUL (ML) INJECTION
Status: DISCONTINUED | OUTPATIENT
Start: 2022-12-20 | End: 2022-12-22

## 2022-12-20 RX ORDER — BUPIVACAINE HYDROCHLORIDE 2.5 MG/ML
20 INJECTION, SOLUTION EPIDURAL; INFILTRATION; INTRACAUDAL ONCE
Status: DISCONTINUED | OUTPATIENT
Start: 2022-12-20 | End: 2022-12-20 | Stop reason: HOSPADM

## 2022-12-20 RX ADMIN — LIDOCAINE HYDROCHLORIDE 3 ML: 10 INJECTION, SOLUTION EPIDURAL; INFILTRATION; INTRACAUDAL; PERINEURAL at 13:26:00

## 2022-12-20 RX ADMIN — LIDOCAINE HYDROCHLORIDE AND EPINEPHRINE 5 ML: 15; 5 INJECTION, SOLUTION EPIDURAL at 13:28:00

## 2022-12-20 NOTE — PROGRESS NOTES
Pt is a 24year old female admitted to [place] [address]. Patient presents with:  Laboring: Painful contractions since 0600     Pt is  41w2d intra-uterine pregnancy. History obtained, consents signed. Oriented to room, staff, and plan of care.

## 2022-12-20 NOTE — TELEPHONE ENCOUNTER
Pt states she is consistently mono every 6 minutes for the last hour and they are strong. Pt advised to go to [facility]. [facility]BC and JLK informed.

## 2022-12-20 NOTE — TELEPHONE ENCOUNTER
Patient started having contractions throughout the day yesterday. For the past hour, they've been 10 minutes apart and lasting 1 minute. Please advise.

## 2022-12-20 NOTE — TELEPHONE ENCOUNTER
41w2d.    Pt states she was mono irregularly all day yesterday. This morning they have become more consistent. for the last hour they have been every 10-12 minutes and lasting about 1 minute. Pt states they are pretty strong. Pt advised we recommend waiting until her contractions are consistently every 10 minutes apart for one hour. Pt advised to make sure she is drinking fluids. Pt states she lives fairly close to the hospital.  Pt will call when contractions are consistently 10 min apart or if her water breaks.

## 2022-12-20 NOTE — ANESTHESIA POSTPROCEDURE EVALUATION
Patient: Cathy Demarco    Procedure Summary     Date: 12/20/22 Room / Location:     Anesthesia Start: 4116 Anesthesia Stop: 2486    Procedure: LABOR ANALGESIA Diagnosis:     Scheduled Providers:  Anesthesiologist: Tee Pringle MD    Anesthesia Type: epidural ASA Status: 2 - Emergent          Anesthesia Type: epidural    Vitals Value Taken Time   /87 12/20/22 1731   Temp 98 12/20/22 1734   Pulse 84 12/20/22 1731   Resp 12 12/20/22 1734   SpO2 98 12/20/22 1734   Vitals shown include unvalidated device data.     [facility] AN Post Evaluation:   Patient Evaluated in floor  Patient Participation: complete - patient participated  Level of Consciousness: awake and alert  Pain Score: 0  Pain Management: adequate  Airway Patency:patent  Dental exam unchanged from preop  Yes    Cardiovascular Status: acceptable  Respiratory Status: acceptable  Postoperative Hydration acceptable      Arleth Seo MD  12/20/2022 5:34 PM

## 2022-12-20 NOTE — ANESTHESIA PROCEDURE NOTES
Labor Analgesia    Date/Time: 12/20/2022 1:26 PM  Performed by: Shannan Massey MD  Authorized by: Shannan Massey MD       General Information and Staff    Start Time:  12/20/2022 1:26 PM  End Time:  12/20/2022 1:31 PM  Anesthesiologist:  Shannan Massey MD  Performed by:   Anesthesiologist  Patient Location:  OB  Site Identification: surface landmarks    Reason for Block: labor epidural    Preanesthetic Checklist: patient identified, IV checked, site marked, risks and benefits discussed, monitors and equipment checked, pre-op evaluation, timeout performed, anesthesia consent and sterile technique used      Procedure Details    Patient Position:  Sitting  Prep: ChloraPrep    Monitoring:  Heart rate  Approach:  Midline    Epidural Needle    Injection Technique:  RYAN air  Needle Type:  Tuohy  Needle Gauge:  18 G  Needle Length:  3.5 in  Needle Insertion Depth:  5  Location:  L3-4    Spinal Needle      Catheter    Catheter Type:  Multi-orifice  Catheter Size:  20 G  Catheter at Skin Depth:  11  Test Dose:  Negative    Assessment    Sensory Level:  T4    Additional Comments

## 2022-12-20 NOTE — TELEPHONE ENCOUNTER
Patient called back in. ... She states her contractions were 10 minutes apart now they are 6 minutes apart. She called to ask if she is in labor and what are there next steps. ......   Please advise

## 2022-12-20 NOTE — PROGRESS NOTES
[facility]    Vaginal Delivery Note    Yamilet Fuentes Patient Status:  Inpatient    10/18/1995 MRN [id number]   Location [address] Attending [place], [facility] [place] Day # 0 PCP No primary care provider on file. Delivery     Infant Info:  Date of Delivery: 2022   Time of Delivery: 4:01 PM  Delivery Type: Normal spontaneous vaginal delivery    Live Information for the patient's : Luz Maria Push Girl [[id number]]   female infant Information for the patient's : Khurramolynn Push Girl [[id number]]   8 lb 9.2 oz (3.89 kg)   Apgars:  1 minute: 9  Brinda               5 minutes: 9                        10 minutes:      Presentation Vertex [1]    Position   Occiput [1] Anterior [1]    Delivery Narrative: Patient pushed for 10 minutes prior to delivering a live female infant over intact perineum in JOHN position. No nuchal cord noted. Infant was bulb suctioned prior to delivering in toto. Cord doubly clamped & cut. Infant handed to awaiting mother. Placenta delivered spontaneously, intact and normal in appearance with 3 vessel cord. First degree laceration repaired with 2-0 Chromic in normal usual fashion. Sphincter intact by visual and digital exam.  No sulcus, periuretheral, nor cervical lacerations noted. Mother in stable condition. Maternal Anesthesia: epidural       Delivery Complications:  none    Neonatologist Present: no    Placenta info:  Date/Time of Delivery: 2022  4:06 PM   Delivery: spontaneous  Placenta to Pathology: no    Cord info:  Cord Gases Submitted: no  Cord Blood Collection: no  Cord Tissue Collection: no  Cord Complications: none    Sponge and Needle Counts:  Verified    QBL: pending    Glenroy CUELLAR  [address] [place] [address]   2022  4:31 PM

## 2022-12-21 LAB
BASOPHILS # BLD AUTO: 0.03 X10(3) UL (ref 0–0.2)
BASOPHILS NFR BLD AUTO: 0.3 %
DEPRECATED RDW RBC AUTO: 43.5 FL (ref 35.1–46.3)
EOSINOPHIL # BLD AUTO: 0.03 X10(3) UL (ref 0–0.7)
EOSINOPHIL NFR BLD AUTO: 0.3 %
ERYTHROCYTE [DISTWIDTH] IN BLOOD BY AUTOMATED COUNT: 13.2 % (ref 11–15)
HCT VFR BLD AUTO: 35.3 %
HGB BLD-MCNC: 11.2 G/DL
IMM GRANULOCYTES # BLD AUTO: 0.05 X10(3) UL (ref 0–1)
IMM GRANULOCYTES NFR BLD: 0.6 %
LYMPHOCYTES # BLD AUTO: 1.83 X10(3) UL (ref 1–4)
LYMPHOCYTES NFR BLD AUTO: 20.4 %
MCH RBC QN AUTO: 28.6 PG (ref 26–34)
MCHC RBC AUTO-ENTMCNC: 31.7 G/DL (ref 31–37)
MCV RBC AUTO: 90.3 FL
MONOCYTES # BLD AUTO: 0.49 X10(3) UL (ref 0.1–1)
MONOCYTES NFR BLD AUTO: 5.5 %
NEUTROPHILS # BLD AUTO: 6.52 X10 (3) UL (ref 1.5–7.7)
NEUTROPHILS # BLD AUTO: 6.52 X10(3) UL (ref 1.5–7.7)
NEUTROPHILS NFR BLD AUTO: 72.9 %
PLATELET # BLD AUTO: 179 10(3)UL (ref 150–450)
RBC # BLD AUTO: 3.91 X10(6)UL
WBC # BLD AUTO: 9 X10(3) UL (ref 4–11)

## 2022-12-21 NOTE — PROGRESS NOTES
Patient up to bathroom with assist x 2. Voided 200. Patient transferred to mother/baby room 367 per wheelchair in stable condition with baby and personal belongings. Accompanied by significant other and staff. Report given to mother/baby Andreia DESAI.

## 2022-12-22 VITALS
DIASTOLIC BLOOD PRESSURE: 63 MMHG | RESPIRATION RATE: 18 BRPM | SYSTOLIC BLOOD PRESSURE: 106 MMHG | OXYGEN SATURATION: 100 % | TEMPERATURE: 98 F | HEART RATE: 68 BPM

## 2022-12-22 RX ORDER — IBUPROFEN 600 MG/1
600 TABLET ORAL EVERY 6 HOURS PRN
Qty: 30 TABLET | Refills: 0 | Status: SHIPPED | OUTPATIENT
Start: 2022-12-22 | End: 2023-01-01

## 2022-12-22 RX ORDER — PSEUDOEPHEDRINE HCL 30 MG
100 TABLET ORAL 2 TIMES DAILY PRN
Qty: 60 CAPSULE | Refills: 0 | Status: SHIPPED | OUTPATIENT
Start: 2022-12-22 | End: 2023-01-21

## 2023-01-02 ENCOUNTER — NST DOCUMENTATION (OUTPATIENT)
Dept: OBGYN CLINIC | Facility: CLINIC | Age: 28
End: 2023-01-02

## 2023-01-02 NOTE — NST
Nonstress Test   Patient: Bonnielee Kawasaki    Gestation: 41w2d    Diagnosis from order:         NST:         NST DOCUMENTATION 12/17/2022   Variability 6-25 BPM   Accelerations Yes   Decelerations None   Baseline 135   Uterine Irritability Yes   Contractions Irregular   Acoustic Stimulator No   Nonstress Test Interpretation Reactive   Nonstress Test Second Interpretation Reactive   FHR Category Category I   Comments Pt sent from office for post dates NST. pt reprots good fetal movement. Reactive NST reported to Dr Corona per telephone. Pt dc home with labor precautions and kick counts. Pt with verbalized understanding. NST Completed by Armando Solitario RN   Disposition home   Provider Notified Dr Corona         I agree with the above evaluation. NST completed. Raissa Corona MD  1/2/2023  3:06 PM

## 2023-01-16 ENCOUNTER — TELEPHONE (OUTPATIENT)
Dept: OBGYN UNIT | Facility: HOSPITAL | Age: 28
End: 2023-01-16

## 2023-02-06 ENCOUNTER — POSTPARTUM (OUTPATIENT)
Dept: OBGYN CLINIC | Facility: CLINIC | Age: 28
End: 2023-02-06

## 2023-02-06 VITALS
SYSTOLIC BLOOD PRESSURE: 104 MMHG | HEART RATE: 70 BPM | DIASTOLIC BLOOD PRESSURE: 67 MMHG | BODY MASS INDEX: 24 KG/M2 | WEIGHT: 143.81 LBS

## 2023-02-06 PROCEDURE — 3074F SYST BP LT 130 MM HG: CPT | Performed by: OBSTETRICS & GYNECOLOGY

## 2023-02-06 PROCEDURE — 3078F DIAST BP <80 MM HG: CPT | Performed by: OBSTETRICS & GYNECOLOGY

## 2023-02-07 PROBLEM — Z78.9 RECENT FOREIGN TRAVEL: Status: RESOLVED | Noted: 2022-05-20 | Resolved: 2023-02-07

## 2023-02-07 PROBLEM — O48.0 POST-DATES PREGNANCY (HCC): Status: RESOLVED | Noted: 2022-12-18 | Resolved: 2023-02-07

## 2023-02-07 PROBLEM — O48.0 POST-DATES PREGNANCY: Status: RESOLVED | Noted: 2022-12-18 | Resolved: 2023-02-07

## 2023-04-27 ENCOUNTER — OFFICE VISIT (OUTPATIENT)
Dept: OBGYN CLINIC | Facility: CLINIC | Age: 28
End: 2023-04-27

## 2023-04-27 VITALS
SYSTOLIC BLOOD PRESSURE: 111 MMHG | DIASTOLIC BLOOD PRESSURE: 72 MMHG | BODY MASS INDEX: 23.61 KG/M2 | HEART RATE: 73 BPM | HEIGHT: 64.7 IN | WEIGHT: 140 LBS

## 2023-04-27 DIAGNOSIS — Z01.419 WELL WOMAN EXAM WITH ROUTINE GYNECOLOGICAL EXAM: Primary | ICD-10-CM

## 2023-04-27 DIAGNOSIS — R10.2 PELVIC PAIN IN FEMALE: ICD-10-CM

## 2023-04-27 DIAGNOSIS — Z30.015 ENCOUNTER FOR INITIAL PRESCRIPTION OF VAGINAL RING HORMONAL CONTRACEPTIVE: ICD-10-CM

## 2023-04-27 PROCEDURE — 3074F SYST BP LT 130 MM HG: CPT | Performed by: OBSTETRICS & GYNECOLOGY

## 2023-04-27 PROCEDURE — 3008F BODY MASS INDEX DOCD: CPT | Performed by: OBSTETRICS & GYNECOLOGY

## 2023-04-27 PROCEDURE — 3078F DIAST BP <80 MM HG: CPT | Performed by: OBSTETRICS & GYNECOLOGY

## 2023-04-27 PROCEDURE — 99395 PREV VISIT EST AGE 18-39: CPT | Performed by: OBSTETRICS & GYNECOLOGY

## 2023-04-27 RX ORDER — ETONOGESTREL AND ETHINYL ESTRADIOL 11.7; 2.7 MG/1; MG/1
1 INSERT, EXTENDED RELEASE VAGINAL
Qty: 3 RING | Refills: 4 | Status: SHIPPED | OUTPATIENT
Start: 2023-04-27

## 2023-05-24 ENCOUNTER — HOSPITAL ENCOUNTER (OUTPATIENT)
Dept: ULTRASOUND IMAGING | Facility: HOSPITAL | Age: 28
Discharge: HOME | End: 2023-05-24
Attending: OBSTETRICS & GYNECOLOGY
Payer: COMMERCIAL

## 2023-05-24 DIAGNOSIS — R10.2 PELVIC PAIN IN FEMALE: ICD-10-CM

## 2023-05-24 PROCEDURE — 76830 TRANSVAGINAL US NON-OB: CPT | Performed by: OBSTETRICS & GYNECOLOGY

## 2023-05-24 PROCEDURE — 76856 US EXAM PELVIC COMPLETE: CPT | Performed by: OBSTETRICS & GYNECOLOGY

## 2024-07-19 DIAGNOSIS — Z30.015 ENCOUNTER FOR INITIAL PRESCRIPTION OF VAGINAL RING HORMONAL CONTRACEPTIVE: ICD-10-CM

## 2024-07-19 RX ORDER — ETONOGESTREL/ETHINYL ESTRADIOL .12-.015MG
RING, VAGINAL VAGINAL
Refills: 0 | OUTPATIENT
Start: 2024-07-19

## 2024-07-31 DIAGNOSIS — Z30.015 ENCOUNTER FOR INITIAL PRESCRIPTION OF VAGINAL RING HORMONAL CONTRACEPTIVE: ICD-10-CM

## 2024-08-01 RX ORDER — ETONOGESTREL AND ETHINYL ESTRADIOL VAGINAL RING .015; .12 MG/D; MG/D
1 RING VAGINAL
Qty: 3 RING | Refills: 4 | OUTPATIENT
Start: 2024-08-01

## 2024-09-20 ENCOUNTER — OFFICE VISIT (OUTPATIENT)
Dept: OBGYN CLINIC | Facility: CLINIC | Age: 29
End: 2024-09-20
Payer: COMMERCIAL

## 2024-09-20 VITALS
DIASTOLIC BLOOD PRESSURE: 66 MMHG | BODY MASS INDEX: 24 KG/M2 | SYSTOLIC BLOOD PRESSURE: 101 MMHG | HEART RATE: 58 BPM | WEIGHT: 142.19 LBS

## 2024-09-20 DIAGNOSIS — Z12.4 SCREENING FOR CERVICAL CANCER: ICD-10-CM

## 2024-09-20 DIAGNOSIS — Z30.44 ENCOUNTER FOR SURVEILLANCE OF VAGINAL RING HORMONAL CONTRACEPTIVE DEVICE: ICD-10-CM

## 2024-09-20 DIAGNOSIS — Z01.419 WELL WOMAN EXAM WITH ROUTINE GYNECOLOGICAL EXAM: Primary | ICD-10-CM

## 2024-09-20 PROCEDURE — 99395 PREV VISIT EST AGE 18-39: CPT | Performed by: NURSE PRACTITIONER

## 2024-09-20 PROCEDURE — 3078F DIAST BP <80 MM HG: CPT | Performed by: NURSE PRACTITIONER

## 2024-09-20 PROCEDURE — 3074F SYST BP LT 130 MM HG: CPT | Performed by: NURSE PRACTITIONER

## 2024-09-20 RX ORDER — ETONOGESTREL AND ETHINYL ESTRADIOL VAGINAL RING .015; .12 MG/D; MG/D
1 RING VAGINAL AS DIRECTED
Qty: 3 RING | Refills: 3 | Status: SHIPPED | OUTPATIENT
Start: 2024-09-20

## 2024-09-20 NOTE — PROGRESS NOTES
[facility]    Obstetrics and Gynecology    Chief Complaint   Patient presents with    Gyn Exam     Annual exam     Medication Request     Nuvaring        iLzet Mcfadden is a 28 year old female  Patient's last menstrual period was 2024 (exact date). presenting for annual gynecology exam.  Last seen 2023  Using nuvaring - off x 1 month. Regular periods on nuvaring. Desires to continue. Sexually active, same partner. No concerns with intercourse.  Feeling well without concerns today  Due for pap    Pap:2019 NILM   Contraception:nuvaring  Mammo: n/a  Colonoscopy: none    OBSTETRICS HISTORY:  OB History    Para Term  AB Living   3 2 2 0 1 2   SAB IAB Ectopic Multiple Live Births   1 0 0 0 2       GYNE HISTORY:  Menarche: 16 YEARS OLD (2024 10:03 AM)  Period Cycle (Days): MONTHLY (2024 10:03 AM)  Period Duration (Days): 4 TO 5 DAYS (2024 10:03 AM)  Period Flow: MODERATE (2024 10:03 AM)  Use of Birth Control (if yes, specify type): Nuvaring (2024 10:03 AM)  Pap Date: 22 (2024 10:03 AM)  Pap Result Notes: NEG PAP (2024 10:03 AM)      History   Sexual Activity    Sexual activity: Not on file           2022    11:44 AM   RECENT PAP RESULTS   Thinprep Pap Negative for intraepithelial lesion or malignancy.          MEDICAL HISTORY:  History reviewed. No pertinent past medical history.  History reviewed. No pertinent surgical history.    SOCIAL HISTORY:  Social History     Socioeconomic History    Marital status:      Spouse name: Not on file    Number of children: Not on file    Years of education: Not on file    Highest education level: Not on file   Occupational History    Not on file   Tobacco Use    Smoking status: Never    Smokeless tobacco: Never   Substance and Sexual Activity    Alcohol use: Yes     Comment: Occasional prior to pregnancy    Drug use: Never    Sexual activity: Not on file   Other Topics Concern    Not on file    Social History Narrative    Not on file     Social Determinants of Health     Financial Resource Strain: Not on file   Food Insecurity: Not on file   Transportation Needs: Not on file   Physical Activity: Sufficiently Active (8/7/2019)    Received from [facility], [email or web address] [facility]    Exercise Vital Sign     Days of Exercise per Week: 4 days     Minutes of Exercise per Session: 120 min   Stress: No Stress Concern Present (8/7/2019)    Received from [email or web address] [facility] [employer] [facility], [email or web address] [facility]    [employer] [place] [employer] Occupational [employer] - Occupational Stress Questionnaire     Feeling of Stress : Only a little   Social Connections: Unknown (8/7/2019)    Received from [facility], [email or web address] [facility]    Social Connection and Isolation Panel [NHANES]     Frequency of Communication with Friends and Family: Patient declined     Frequency of Social Gatherings with Friends and Family: Patient declined     Attends Mormon Services: Patient declined     Active Member of Clubs or Organizations: Patient declined     Attends Club or Organization Meetings: Patient declined     Marital Status: Patient declined   Housing Stability: At Risk (8/18/2023)    Received from [facility]     Living Situation: Not on file     Housing Problems: Not on file         Depression Screening (PHQ-2/PHQ-9): Over the LAST 2 WEEKS   Little interest or pleasure in doing things (over the last two weeks)?: Not at all    Feeling down, depressed, or hopeless (over the last two weeks)?: Not at all    PHQ-2 SCORE: 0           FAMILY HISTORY:  Family History   Problem Relation Age of Onset    Diabetes Paternal Aunt        MEDICATIONS:    Current Outpatient Medications:     Etonogestrel-Ethinyl Estradiol (NUVARING) 0.12-0.015 MG/24HR Vaginal Ring, Place 1 Ring vaginally As Directed. Use ring for 3 weeks, then remove for 1 week; then replace with new ring,  Disp: 3 Ring, Rfl: 3    prenatal vitamin with DHA 27-0.8-228 MG Oral Cap, Take 1 capsule by mouth daily., Disp: , Rfl:     ALLERGIES:  No Known Allergies      Review of Systems:  Constitutional:  Denies fatigue, night sweats, hot flashes  Eyes:  denies blurred or double vision  Cardiovascular:  denies chest pain or palpitations  Respiratory:  denies shortness of breath  Gastrointestinal:  denies heartburn, abdominal pain, diarrhea or constipation  Genitourinary:  denies dysuria, incontinence, abnormal vaginal discharge, vaginal itching,   Musculoskeletal:  denies back pain   Skin/Breast:  Denies any breast pain, lumps, or discharge.   Neurological:  denies headaches, extremity weakness or numbness.  Psychiatric: denies depression or anxiety.  Endocrine:   denies excessive thirst or urination.  Heme/Lymph:  denies history of anemia, easy bruising or bleeding.      PHYSICAL EXAM:     Vitals:    09/20/24 1012   BP: 101/66   Pulse: 58   Weight: 142 lb 3.2 oz (64.5 kg)       Body mass index is 23.88 kg/m².     Patient offered chaperone, patient declined    Constitutional: well developed, well nourished  Psychiatric:  Oriented to time, place, person and situation. Appropriate mood and affect  Head/Face: normocephalic  Neck/Thyroid: thyroid symmetric, no thyromegaly, no nodules, no adenopathy  Lymphatic:no abnormal supraclavicular or axillary adenopathy is noted  Breast: normal without palpable masses, tenderness, asymmetry, nipple discharge, nipple retraction or skin changes  Abdomen:  soft, nontender, nondistended, no masses  Skin/Hair: no unusual rashes or bruises  Extremities: no edema, no cyanosis    Pelvic Exam:  External Genitalia: normal appearance, hair distribution, and no lesions  Urethral Meatus:  normal in size, location, without lesions and prolapse  Bladder:  No fullness, masses or tenderness  Vagina:  Normal appearance without lesions, no abnormal discharge  Cervix:  Normal without tenderness on  motion  Uterus: normal in size, contour, position, mobility, without tenderness  Adnexa: normal without masses or tenderness  Perineum: normal  Anus: no hemorroids     Assessment & Plan:    ICD-10-CM    1. Well woman exam with routine gynecological exam  Z01.419       2. Screening for cervical cancer  Z12.4 ThinPrep PAP with HPV Reflex Request B      3. Encounter for surveillance of vaginal ring hormonal contraceptive device  Z30.44 Etonogestrel-Ethinyl Estradiol (NUVARING) 0.12-0.015 MG/24HR Vaginal Ring         Reviewed ASCCP guidelines with the patient   Pap due   Contraception: Using nuvaring - desires to continue. Reviewed use, risks, benefits and side effects.   Breast Health:     Reviewed current guidelines with the patient and to start Mammograms at age 40  Reviewed monthly self breast exams with the patient   Discussed diet, exercise, MVIs with Ca/Vit D  Follow up in 1 yr for WWTREMAYNE Mcgraw    This note was prepared using Dragon Medical voice recognition dictation software. As a result errors may occur. When identified these errors have been corrected. While every attempt is made to correct errors during dictation discrepancies may still exist.

## 2024-12-05 ENCOUNTER — OFFICE VISIT (OUTPATIENT)
Dept: FAMILY MEDICINE CLINIC | Facility: CLINIC | Age: 29
End: 2024-12-05
Payer: COMMERCIAL

## 2024-12-05 VITALS
WEIGHT: 142 LBS | HEIGHT: 64.9 IN | OXYGEN SATURATION: 99 % | DIASTOLIC BLOOD PRESSURE: 68 MMHG | BODY MASS INDEX: 23.66 KG/M2 | SYSTOLIC BLOOD PRESSURE: 110 MMHG | HEART RATE: 69 BPM

## 2024-12-05 DIAGNOSIS — M25.512 CHRONIC LEFT SHOULDER PAIN: ICD-10-CM

## 2024-12-05 DIAGNOSIS — Z00.01 ENCOUNTER FOR GENERAL ADULT MEDICAL EXAMINATION WITH ABNORMAL FINDINGS: Primary | ICD-10-CM

## 2024-12-05 DIAGNOSIS — Z11.3 SCREENING FOR VENEREAL DISEASE: ICD-10-CM

## 2024-12-05 DIAGNOSIS — G89.29 CHRONIC LEFT SHOULDER PAIN: ICD-10-CM

## 2024-12-05 DIAGNOSIS — N92.6 MISSED PERIOD: ICD-10-CM

## 2024-12-05 DIAGNOSIS — N93.9 ABNORMAL VAGINAL BLEEDING: ICD-10-CM

## 2024-12-05 DIAGNOSIS — E55.9 VITAMIN D DEFICIENCY: ICD-10-CM

## 2024-12-05 LAB
CONTROL LINE PRESENT WITH A CLEAR BACKGROUND (YES/NO): YES YES/NO
KIT LOT #: NORMAL NUMERIC
PREGNANCY TEST, URINE: NEGATIVE

## 2024-12-05 NOTE — PROGRESS NOTES
Subjective:   Lizet Mcfadden is a 29 year old female who presents for Physical (Annual Physical, no flu shot ).     New patient here for her  routine general physical exam.  Patient denies any chronic medical problems.   Patient is requesting blood testing. Diet and exercise have been fair.  Past medical history, family history, and social history were reviewed.    New concerns discussed during this visit:    Patient was spotting for 2 weeks before her period. LMP was 10/28/24-11/10/24. This was the first time she has experienced prolonged bleeding. Patient has been on vaginal ring for the past 2 years.Patient denies passing clots or severe cramping. Patient is sexually active 1 male partner. Patient request STD testing.     Left shoulder pain for the past 1 year she can feel a pop once a day in the morning. Haing pain with reaching and lifting shoulder. Mack states she was at the gym when she felt like she hyperextened the shoulder while doing a shoulder press    History/Other:      Chief Complaint Reviewed and Verified  Nursing Notes Reviewed and   Verified  Tobacco Reviewed  Allergies Reviewed  Medications Reviewed    Problem List Reviewed  Medical History Reviewed  Surgical History   Reviewed  OB Status Reviewed  Family History Reviewed  Social History   Reviewed           Tobacco:  She has never smoked tobacco.      Current Outpatient Medications   Medication Sig Dispense Refill    Etonogestrel-Ethinyl Estradiol (NUVARING) 0.12-0.015 MG/24HR Vaginal Ring Place 1 Ring vaginally As Directed. Use ring for 3 weeks, then remove for 1 week; then replace with new ring 3 Ring 3       Allergies[1]      Review of Systems   Constitutional: Negative.  Negative for activity change and fatigue.   HENT: Negative.  Negative for congestion, ear pain, rhinorrhea and sneezing.    Eyes: Negative.  Negative for redness.   Respiratory: Negative.  Negative for cough, shortness of breath and wheezing.     Cardiovascular: Negative.  Negative for chest pain.   Gastrointestinal: Negative.  Negative for abdominal pain, constipation, diarrhea, nausea and vomiting.   Endocrine: Negative.    Genitourinary:  Positive for menstrual problem. Negative for difficulty urinating and frequency.   Musculoskeletal:  Positive for joint swelling. Negative for back pain and myalgias.   Skin: Negative.  Negative for rash.   Allergic/Immunologic: Negative.    Neurological: Negative.  Negative for dizziness, syncope, light-headedness and headaches.   Hematological: Negative.    Psychiatric/Behavioral: Negative.           Objective:   /68 (BP Location: Right arm, Patient Position: Sitting, Cuff Size: adult)   Pulse 69   Ht 5' 4.9\" (1.648 m)   Wt 142 lb (64.4 kg)   LMP 10/20/2024 (Exact Date)   SpO2 99%   BMI 23.70 kg/m²  Estimated body mass index is 23.7 kg/m² as calculated from the following:    Height as of this encounter: 5' 4.9\" (1.648 m).    Weight as of this encounter: 142 lb (64.4 kg).      Physical Exam  Vitals and nursing note reviewed.   Constitutional:       Appearance: Normal appearance. She is normal weight.   HENT:      Head: Normocephalic and atraumatic.      Right Ear: Tympanic membrane normal.      Left Ear: Tympanic membrane normal.      Nose: Nose normal.      Mouth/Throat:      Mouth: Mucous membranes are moist.      Pharynx: Oropharynx is clear.   Eyes:      Extraocular Movements: Extraocular movements intact.      Conjunctiva/sclera: Conjunctivae normal.      Pupils: Pupils are equal, round, and reactive to light.   Cardiovascular:      Rate and Rhythm: Normal rate and regular rhythm.      Pulses: Normal pulses.      Heart sounds: Normal heart sounds.   Pulmonary:      Effort: Pulmonary effort is normal.      Breath sounds: Normal breath sounds.   Abdominal:      General: Abdomen is flat. Bowel sounds are normal.      Palpations: Abdomen is soft.   Musculoskeletal:      Left shoulder: Swelling and  tenderness present. Decreased range of motion.      Cervical back: Normal range of motion and neck supple.   Skin:     General: Skin is warm and dry.   Neurological:      General: No focal deficit present.      Mental Status: She is alert and oriented to person, place, and time. Mental status is at baseline.   Psychiatric:         Mood and Affect: Mood normal.         Behavior: Behavior normal.         Thought Content: Thought content normal.         Judgment: Judgment normal.           Assessment & Plan:     1. Encounter for general adult medical examination with abnormal findings  - CBC With Differential With Platelet; Future  - Comp Metabolic Panel (14); Future  - Lipid Panel; Future  - TSH W Reflex To Free T4  -Exam is unremarkable  -Screening tests/lab were discussed   -Will order blood works   -Healthy diet, exercise, and weight were discussed.  -Advised to call for any new problems and follow up for further management after testings have been done     2. Abnormal vaginal bleeding  - Iron And Tibc; Future  - Ferritin; Future  -Patient advised to follow up with OBGYN  3. Chronic left shoulder pain  - XR SHOULDER, COMPLETE (MIN 2 VIEWS), LEFT (CPT=73030); Future  -Patient can take OTC tylenol/ibuprofen for breakthrough pain  -Stretches/moist warm compresses/massages for 10-15 minutes 3-4 times a day  -Proper lifting techniques  -No heavy lifting for a week.     4. Missed period  - POC Urine pregnancy test [72235]    5. Vitamin D deficiency  - Vitamin D; Future    6. Screening for venereal disease  - Chlamydia/Gc Amplification; Future  - Hepatitis Panel, Acute (4); Future  - HIV Ag/Ab Combo; Future  - T Pallidum Screening [place]; Future         Medication use, effects and side effects discussed in detail with patient. The patient  indicated understanding of the diagnosis and agreed with the plan of care.    Return if symptoms worsen or fail to improve.    Calista Murrieta, TREMAYNE         [1] No Known Allergies

## 2024-12-06 ENCOUNTER — HOSPITAL ENCOUNTER (OUTPATIENT)
Dept: GENERAL RADIOLOGY | Facility: HOSPITAL | Age: 29
Discharge: HOME | End: 2024-12-06
Payer: COMMERCIAL

## 2024-12-06 ENCOUNTER — LAB ENCOUNTER (OUTPATIENT)
Dept: LAB | Facility: HOSPITAL | Age: 29
End: 2024-12-06
Payer: COMMERCIAL

## 2024-12-06 DIAGNOSIS — Z11.3 SCREENING FOR VENEREAL DISEASE: ICD-10-CM

## 2024-12-06 DIAGNOSIS — E55.9 VITAMIN D DEFICIENCY: ICD-10-CM

## 2024-12-06 DIAGNOSIS — G89.29 CHRONIC LEFT SHOULDER PAIN: ICD-10-CM

## 2024-12-06 DIAGNOSIS — Z00.01 ENCOUNTER FOR GENERAL ADULT MEDICAL EXAMINATION WITH ABNORMAL FINDINGS: ICD-10-CM

## 2024-12-06 DIAGNOSIS — N93.9 ABNORMAL VAGINAL BLEEDING: ICD-10-CM

## 2024-12-06 DIAGNOSIS — M25.512 CHRONIC LEFT SHOULDER PAIN: ICD-10-CM

## 2024-12-06 LAB
ALBUMIN SERPL-MCNC: 4.5 G/DL (ref 3.2–4.8)
ALBUMIN/GLOB SERPL: 1.7 {RATIO} (ref 1–2)
ALP LIVER SERPL-CCNC: 65 U/L
ALT SERPL-CCNC: 22 U/L
ANION GAP SERPL CALC-SCNC: 6 MMOL/L (ref 0–18)
AST SERPL-CCNC: 29 U/L (ref ?–34)
BASOPHILS # BLD AUTO: 0.02 X10(3) UL (ref 0–0.2)
BASOPHILS NFR BLD AUTO: 0.3 %
BILIRUB SERPL-MCNC: 0.5 MG/DL (ref 0.3–1.2)
BUN BLD-MCNC: 13 MG/DL (ref 9–23)
BUN/CREAT SERPL: 15.1 (ref 10–20)
CALCIUM BLD-MCNC: 9.3 MG/DL (ref 8.7–10.4)
CHLORIDE SERPL-SCNC: 109 MMOL/L (ref 98–112)
CHOLEST SERPL-MCNC: 168 MG/DL (ref ?–200)
CO2 SERPL-SCNC: 26 MMOL/L (ref 21–32)
CREAT BLD-MCNC: 0.86 MG/DL
DEPRECATED HBV CORE AB SER IA-ACNC: 11 NG/ML
DEPRECATED RDW RBC AUTO: 39.7 FL (ref 35.1–46.3)
EGFRCR SERPLBLD CKD-EPI 2021: 95 ML/MIN/1.73M2 (ref 60–?)
EOSINOPHIL # BLD AUTO: 0.03 X10(3) UL (ref 0–0.7)
EOSINOPHIL NFR BLD AUTO: 0.4 %
ERYTHROCYTE [DISTWIDTH] IN BLOOD BY AUTOMATED COUNT: 12 % (ref 11–15)
FASTING PATIENT LIPID ANSWER: YES
FASTING STATUS PATIENT QL REPORTED: YES
GLOBULIN PLAS-MCNC: 2.7 G/DL (ref 2–3.5)
GLUCOSE BLD-MCNC: 81 MG/DL (ref 70–99)
HAV IGM SER QL: NONREACTIVE
HBV CORE IGM SER QL: NONREACTIVE
HBV SURFACE AG SERPL QL IA: NONREACTIVE
HCT VFR BLD AUTO: 40.4 %
HCV AB SERPL QL IA: NONREACTIVE
HDLC SERPL-MCNC: 54 MG/DL (ref 40–59)
HGB BLD-MCNC: 13.8 G/DL
IMM GRANULOCYTES # BLD AUTO: 0.01 X10(3) UL (ref 0–1)
IMM GRANULOCYTES NFR BLD: 0.1 %
IRON SATN MFR SERPL: 26 %
IRON SERPL-MCNC: 115 UG/DL
LDLC SERPL CALC-MCNC: 95 MG/DL (ref ?–100)
LYMPHOCYTES # BLD AUTO: 1.7 X10(3) UL (ref 1–4)
LYMPHOCYTES NFR BLD AUTO: 25.1 %
MCH RBC QN AUTO: 30.8 PG (ref 26–34)
MCHC RBC AUTO-ENTMCNC: 34.2 G/DL (ref 31–37)
MCV RBC AUTO: 90.2 FL
MONOCYTES # BLD AUTO: 0.27 X10(3) UL (ref 0.1–1)
MONOCYTES NFR BLD AUTO: 4 %
NEUTROPHILS # BLD AUTO: 4.75 X10 (3) UL (ref 1.5–7.7)
NEUTROPHILS # BLD AUTO: 4.75 X10(3) UL (ref 1.5–7.7)
NEUTROPHILS NFR BLD AUTO: 70.1 %
NONHDLC SERPL-MCNC: 114 MG/DL (ref ?–130)
OSMOLALITY SERPL CALC.SUM OF ELEC: 291 MOSM/KG (ref 275–295)
PLATELET # BLD AUTO: 204 10(3)UL (ref 150–450)
POTASSIUM SERPL-SCNC: 4.2 MMOL/L (ref 3.5–5.1)
PROT SERPL-MCNC: 7.2 G/DL (ref 5.7–8.2)
RBC # BLD AUTO: 4.48 X10(6)UL
SODIUM SERPL-SCNC: 141 MMOL/L (ref 136–145)
T PALLIDUM AB SER QL IA: NONREACTIVE
TIBC SERPL-MCNC: 443 UG/DL (ref 250–425)
TRANSFERRIN SERPL-MCNC: 297 MG/DL (ref 250–380)
TRIGL SERPL-MCNC: 107 MG/DL (ref 30–149)
TSI SER-ACNC: 1.55 UIU/ML (ref 0.55–4.78)
VIT D+METAB SERPL-MCNC: 20 NG/ML (ref 30–100)
VLDLC SERPL CALC-MCNC: 17 MG/DL (ref 0–30)
WBC # BLD AUTO: 6.8 X10(3) UL (ref 4–11)

## 2024-12-06 PROCEDURE — 86780 TREPONEMA PALLIDUM: CPT

## 2024-12-06 PROCEDURE — 80074 ACUTE HEPATITIS PANEL: CPT

## 2024-12-06 PROCEDURE — 85025 COMPLETE CBC W/AUTO DIFF WBC: CPT

## 2024-12-06 PROCEDURE — 84466 ASSAY OF TRANSFERRIN: CPT

## 2024-12-06 PROCEDURE — 82306 VITAMIN D 25 HYDROXY: CPT

## 2024-12-06 PROCEDURE — 87591 N.GONORRHOEAE DNA AMP PROB: CPT

## 2024-12-06 PROCEDURE — 73030 X-RAY EXAM OF SHOULDER: CPT

## 2024-12-06 PROCEDURE — 82728 ASSAY OF FERRITIN: CPT

## 2024-12-06 PROCEDURE — 87491 CHLMYD TRACH DNA AMP PROBE: CPT

## 2024-12-06 PROCEDURE — 83540 ASSAY OF IRON: CPT

## 2024-12-06 PROCEDURE — 80061 LIPID PANEL: CPT

## 2024-12-06 PROCEDURE — 87389 HIV-1 AG W/HIV-1&-2 AB AG IA: CPT

## 2024-12-06 PROCEDURE — 36415 COLL VENOUS BLD VENIPUNCTURE: CPT

## 2024-12-06 PROCEDURE — 80053 COMPREHEN METABOLIC PANEL: CPT

## 2024-12-06 PROCEDURE — 84443 ASSAY THYROID STIM HORMONE: CPT

## 2024-12-09 DIAGNOSIS — M25.512 CHRONIC LEFT SHOULDER PAIN: Primary | ICD-10-CM

## 2024-12-09 DIAGNOSIS — G89.29 CHRONIC LEFT SHOULDER PAIN: Primary | ICD-10-CM

## 2024-12-09 LAB
C TRACH DNA SPEC QL NAA+PROBE: NEGATIVE
N GONORRHOEA DNA SPEC QL NAA+PROBE: NEGATIVE

## 2025-02-13 ENCOUNTER — OFFICE VISIT (OUTPATIENT)
Dept: OBGYN CLINIC | Facility: CLINIC | Age: 30
End: 2025-02-13
Payer: COMMERCIAL

## 2025-02-13 VITALS
BODY MASS INDEX: 24 KG/M2 | WEIGHT: 145.63 LBS | DIASTOLIC BLOOD PRESSURE: 69 MMHG | SYSTOLIC BLOOD PRESSURE: 98 MMHG | HEART RATE: 65 BPM

## 2025-02-13 DIAGNOSIS — Z30.44 ENCOUNTER FOR SURVEILLANCE OF VAGINAL RING HORMONAL CONTRACEPTIVE DEVICE: ICD-10-CM

## 2025-02-13 PROCEDURE — 3074F SYST BP LT 130 MM HG: CPT | Performed by: NURSE PRACTITIONER

## 2025-02-13 PROCEDURE — 3078F DIAST BP <80 MM HG: CPT | Performed by: NURSE PRACTITIONER

## 2025-02-13 RX ORDER — ETONOGESTREL AND ETHINYL ESTRADIOL VAGINAL RING .015; .12 MG/D; MG/D
1 RING VAGINAL AS DIRECTED
Qty: 3 RING | Refills: 3 | Status: SHIPPED | OUTPATIENT
Start: 2025-02-13

## 2025-02-13 NOTE — PROGRESS NOTES
Patient came in, pharmacy told her she needed an appointment for refill of her medication. Last annual 9/2024 - prescription for nuvaring at that time sent for #3 with 3 RF.    Spoke with pharmacist and said order said closed out due to change in pharmacy. New prescription sent in.   Patient doing well no issues, due for annual in September    Marialuisa Hutchins, APRN

## 2025-07-08 ENCOUNTER — LAB ENCOUNTER (OUTPATIENT)
Dept: LAB | Age: 30
End: 2025-07-08
Payer: COMMERCIAL

## 2025-07-08 ENCOUNTER — OFFICE VISIT (OUTPATIENT)
Dept: FAMILY MEDICINE CLINIC | Facility: CLINIC | Age: 30
End: 2025-07-08
Payer: COMMERCIAL

## 2025-07-08 VITALS
SYSTOLIC BLOOD PRESSURE: 112 MMHG | DIASTOLIC BLOOD PRESSURE: 70 MMHG | BODY MASS INDEX: 24.03 KG/M2 | HEIGHT: 65.2 IN | HEART RATE: 75 BPM | WEIGHT: 146 LBS | OXYGEN SATURATION: 99 %

## 2025-07-08 DIAGNOSIS — Z11.1 SCREENING FOR TUBERCULOSIS: ICD-10-CM

## 2025-07-08 DIAGNOSIS — Z02.1 PHYSICAL EXAM, PRE-EMPLOYMENT: Primary | ICD-10-CM

## 2025-07-08 PROCEDURE — 36415 COLL VENOUS BLD VENIPUNCTURE: CPT

## 2025-07-08 PROCEDURE — 86480 TB TEST CELL IMMUN MEASURE: CPT

## 2025-07-08 NOTE — PROGRESS NOTES
Subjective:   Lizet Mcfadden is a 29 year old female who presents for Complete Form (Pt needs a work physical form filled out. ).     HPI   Patient presents for a pre-employment physical and evaluation. No acute issues; No significant chronic medical problems; No history of communicable disease or issues that would limit ability to work. Pt will be working as a  for the [employer].     History/Other:      Chief Complaint Reviewed and Verified  Nursing Notes Reviewed and   Verified  Tobacco Reviewed  Allergies Reviewed  Medications Reviewed    Problem List Reviewed  Medical History Reviewed  Surgical History   Reviewed  OB Status Reviewed  Family History Reviewed  Social History   Reviewed           Tobacco:  She has never smoked tobacco.      Current Medications[1]    Allergies[2]    Review of Systems   Constitutional: Negative.  Negative for activity change and fatigue.   HENT: Negative.  Negative for congestion, ear pain, rhinorrhea and sneezing.    Eyes: Negative.  Negative for redness.   Respiratory: Negative.  Negative for cough, shortness of breath and wheezing.    Cardiovascular: Negative.  Negative for chest pain.   Gastrointestinal: Negative.  Negative for abdominal pain, constipation, diarrhea, nausea and vomiting.   Endocrine: Negative.    Genitourinary: Negative.  Negative for difficulty urinating and frequency.   Musculoskeletal: Negative.  Negative for back pain, joint swelling and myalgias.   Skin: Negative.  Negative for rash.   Allergic/Immunologic: Negative.    Neurological: Negative.  Negative for dizziness, syncope, light-headedness and headaches.   Hematological: Negative.    Psychiatric/Behavioral: Negative.           Objective:   /70 (BP Location: Left arm, Patient Position: Sitting, Cuff Size: adult)   Pulse 75   Ht 5' 5.2\" (1.656 m)   Wt 146 lb (66.2 kg)   LMP 06/14/2025 (Exact Date)   SpO2 99%   BMI 24.15 kg/m²  Estimated body  mass index is 24.15 kg/m² as calculated from the following:    Height as of this encounter: 5' 5.2\" (1.656 m).    Weight as of this encounter: 146 lb (66.2 kg).      Physical Exam  Vitals and nursing note reviewed.   Constitutional:       Appearance: Normal appearance. She is normal weight.   HENT:      Head: Normocephalic and atraumatic.      Right Ear: Tympanic membrane normal.      Left Ear: Tympanic membrane normal.      Nose: Nose normal.      Mouth/Throat:      Mouth: Mucous membranes are moist.      Pharynx: Oropharynx is clear.   Eyes:      Extraocular Movements: Extraocular movements intact.      Conjunctiva/sclera: Conjunctivae normal.      Pupils: Pupils are equal, round, and reactive to light.   Cardiovascular:      Rate and Rhythm: Normal rate and regular rhythm.      Pulses: Normal pulses.      Heart sounds: Normal heart sounds.   Pulmonary:      Effort: Pulmonary effort is normal.      Breath sounds: Normal breath sounds.   Abdominal:      General: Abdomen is flat. Bowel sounds are normal.      Palpations: Abdomen is soft.   Musculoskeletal:         General: Normal range of motion.      Cervical back: Normal range of motion and neck supple.   Skin:     General: Skin is warm and dry.   Neurological:      General: No focal deficit present.      Mental Status: She is alert and oriented to person, place, and time. Mental status is at baseline.   Psychiatric:         Mood and Affect: Mood normal.         Behavior: Behavior normal.         Thought Content: Thought content normal.         Judgment: Judgment normal.           Assessment & Plan:     Assessment & Plan  Physical exam, pre-employment  -Exam is unremarkable: Will clear for employment pending TB test  -Forms filled out  -To call if any problems         Screening for tuberculosis    Orders:    Quantiferon TB Plus; Future     Medication use, effects and side effects discussed in detail with patient. The patient indicated understanding of the  diagnosis and agreed with the plan of care.    Return for Annual Physical.    TREMAYNE Wills       [1]   Current Outpatient Medications   Medication Sig Dispense Refill    Etonogestrel-Ethinyl Estradiol (NUVARING) 0.12-0.015 MG/24HR Vaginal Ring Place 1 Ring vaginally As Directed. Use ring for 3 weeks, then remove for 1 week; then replace with new ring 3 Ring 3   [2] No Known Allergies

## 2025-07-10 LAB
M TB IFN-G CD4+ T-CELLS BLD-ACNC: 0.06 IU/ML
M TB TUBERC IFN-G BLD QL: NEGATIVE
M TB TUBERC IGNF/MITOGEN IGNF CONTROL: >10 IU/ML
QFT TB1 AG MINUS NIL: -0.01 IU/ML
QFT TB2 AG MINUS NIL: 0.01 IU/ML

## (undated) NOTE — LETTER
22          RE:  Annmarie Wade  :  10/18/1995      To Whom It May Concern:    Annmarie Wade  is currently under our care for pregnancy. It is permissible at her gestational age for dental work to be performed, including extractions. However, if x-rays are needed, please make sure that the abdomen is shielded appropriately, and thoroughly. Analgesia is permissible with lidocaine or xylocaine with or without epinephrine. For post treatment pain relief, Tylenol with codeine and Norco are acceptable. If an antibiotic is needed, a penicillin derivative may be used. If penicillin allergic then erythromycin may be used. If you have any additional concerns, do not hesitate to contact our office.     Sincerely,      Karin العراقي, [address] [facility]  [phone]

## (undated) NOTE — LETTER
22          RE:  Cathy Demarco  :  10/18/1995      To Whom It May Concern:    Cathy Demarco  is currently under our care for pregnancy. It is permissible at her gestational age for dental work to be performed. However, if x-rays are needed, please make sure that the abdomen is shielded appropriately, and thoroughly. Analgesia is permissible with lidocaine only, no epinephrine. For post treatment pain relief, Tylenol with codeine and Vicodin are acceptable. If an antibiotic is needed, a penicillin derivative may be used. If penicillin allergic then erythromycin may be used. If you have any additional concerns, do not hesitate to contact our office.     Sincerely,        Dr. Vesna Mullins, [facility]  [phone]

## (undated) NOTE — LETTER
VACCINE ADMINISTRATION RECORD  PARENT / GUARDIAN APPROVAL  Date: 9/15/2022  Vaccine administered to: Floydene Carrel     : 10/18/1995    MRN: [id number]    A copy of the appropriate Centers for Disease Control and Prevention Vaccine Information statement has been provided. I have read or have had explained the information about the diseases and the vaccines listed below. There was an opportunity to ask questions and any questions were answered satisfactorily. I believe that I understand the benefits and risks of the vaccine cited and ask that the vaccine(s) listed below be given to me or to the person named above (for whom I am authorized to make this request). VACCINES ADMINISTERED:  Tdap    I have read and hereby agree to be bound by the terms of this agreement as stated above. My signature is valid until revoked by me in writing. This document is signed by SELF, relationship: SELF on 9/15/2022.:                                                                                                                                         Parent / Guardian Signature                                                Date    Nicole Miller served as a witness to authentication that the identity of the person signing electronically is in fact the person represented as signing.